# Patient Record
Sex: FEMALE | Race: WHITE | NOT HISPANIC OR LATINO | Employment: FULL TIME | URBAN - METROPOLITAN AREA
[De-identification: names, ages, dates, MRNs, and addresses within clinical notes are randomized per-mention and may not be internally consistent; named-entity substitution may affect disease eponyms.]

---

## 2017-04-28 ENCOUNTER — COMMUNICATION - HEALTHEAST (OUTPATIENT)
Dept: FAMILY MEDICINE | Facility: CLINIC | Age: 22
End: 2017-04-28

## 2017-04-28 DIAGNOSIS — F33.2 SEVERE EPISODE OF RECURRENT MAJOR DEPRESSIVE DISORDER, WITHOUT PSYCHOTIC FEATURES (H): ICD-10-CM

## 2017-09-06 ENCOUNTER — COMMUNICATION - HEALTHEAST (OUTPATIENT)
Dept: FAMILY MEDICINE | Facility: CLINIC | Age: 22
End: 2017-09-06

## 2017-09-07 ENCOUNTER — AMBULATORY - HEALTHEAST (OUTPATIENT)
Dept: FAMILY MEDICINE | Facility: CLINIC | Age: 22
End: 2017-09-07

## 2017-09-07 DIAGNOSIS — F33.2 SEVERE EPISODE OF RECURRENT MAJOR DEPRESSIVE DISORDER, WITHOUT PSYCHOTIC FEATURES (H): ICD-10-CM

## 2017-09-11 ENCOUNTER — COMMUNICATION - HEALTHEAST (OUTPATIENT)
Dept: FAMILY MEDICINE | Facility: CLINIC | Age: 22
End: 2017-09-11

## 2017-09-11 DIAGNOSIS — F33.2 SEVERE EPISODE OF RECURRENT MAJOR DEPRESSIVE DISORDER, WITHOUT PSYCHOTIC FEATURES (H): ICD-10-CM

## 2017-10-10 ENCOUNTER — OFFICE VISIT - HEALTHEAST (OUTPATIENT)
Dept: FAMILY MEDICINE | Facility: CLINIC | Age: 22
End: 2017-10-10

## 2017-10-10 DIAGNOSIS — Z30.09 BIRTH CONTROL COUNSELING: ICD-10-CM

## 2017-10-10 DIAGNOSIS — F33.1 MODERATE EPISODE OF RECURRENT MAJOR DEPRESSIVE DISORDER (H): ICD-10-CM

## 2017-10-10 ASSESSMENT — MIFFLIN-ST. JEOR: SCORE: 1364.56

## 2017-11-14 ENCOUNTER — COMMUNICATION - HEALTHEAST (OUTPATIENT)
Dept: FAMILY MEDICINE | Facility: CLINIC | Age: 22
End: 2017-11-14

## 2018-06-01 ENCOUNTER — COMMUNICATION - HEALTHEAST (OUTPATIENT)
Dept: FAMILY MEDICINE | Facility: CLINIC | Age: 23
End: 2018-06-01

## 2018-06-01 DIAGNOSIS — F33.1 MODERATE EPISODE OF RECURRENT MAJOR DEPRESSIVE DISORDER (H): ICD-10-CM

## 2018-06-04 ENCOUNTER — COMMUNICATION - HEALTHEAST (OUTPATIENT)
Dept: FAMILY MEDICINE | Facility: CLINIC | Age: 23
End: 2018-06-04

## 2018-06-04 DIAGNOSIS — F33.1 MODERATE EPISODE OF RECURRENT MAJOR DEPRESSIVE DISORDER (H): ICD-10-CM

## 2018-10-31 ENCOUNTER — COMMUNICATION - HEALTHEAST (OUTPATIENT)
Dept: CARE COORDINATION | Facility: CLINIC | Age: 23
End: 2018-10-31

## 2018-12-17 ENCOUNTER — OFFICE VISIT - HEALTHEAST (OUTPATIENT)
Dept: FAMILY MEDICINE | Facility: CLINIC | Age: 23
End: 2018-12-17

## 2018-12-17 DIAGNOSIS — F10.20 SEVERE ALCOHOL USE DISORDER (H): ICD-10-CM

## 2018-12-17 DIAGNOSIS — R20.2 PARESTHESIA: ICD-10-CM

## 2018-12-17 DIAGNOSIS — F43.12 CHRONIC POST-TRAUMATIC STRESS DISORDER (PTSD): ICD-10-CM

## 2018-12-17 DIAGNOSIS — F31.81 BIPOLAR 2 DISORDER, MAJOR DEPRESSIVE EPISODE (H): ICD-10-CM

## 2018-12-17 LAB
HBA1C MFR BLD: 5.2 % (ref 3.5–6)
RHEUMATOID FACT SERPL-ACNC: <15 IU/ML (ref 0–30)
VIT B12 SERPL-MCNC: 1210 PG/ML (ref 213–816)

## 2018-12-17 ASSESSMENT — MIFFLIN-ST. JEOR: SCORE: 1410.38

## 2018-12-18 ENCOUNTER — COMMUNICATION - HEALTHEAST (OUTPATIENT)
Dept: FAMILY MEDICINE | Facility: CLINIC | Age: 23
End: 2018-12-18

## 2018-12-18 LAB — ANA SER QL: 0.1 U

## 2019-01-14 ENCOUNTER — COMMUNICATION - HEALTHEAST (OUTPATIENT)
Dept: FAMILY MEDICINE | Facility: CLINIC | Age: 24
End: 2019-01-14

## 2019-01-22 ENCOUNTER — COMMUNICATION - HEALTHEAST (OUTPATIENT)
Dept: SCHEDULING | Facility: CLINIC | Age: 24
End: 2019-01-22

## 2019-01-23 ENCOUNTER — AMBULATORY - HEALTHEAST (OUTPATIENT)
Dept: FAMILY MEDICINE | Facility: CLINIC | Age: 24
End: 2019-01-23

## 2019-03-19 ENCOUNTER — COMMUNICATION - HEALTHEAST (OUTPATIENT)
Dept: FAMILY MEDICINE | Facility: CLINIC | Age: 24
End: 2019-03-19

## 2019-03-25 ENCOUNTER — OFFICE VISIT - HEALTHEAST (OUTPATIENT)
Dept: FAMILY MEDICINE | Facility: CLINIC | Age: 24
End: 2019-03-25

## 2019-03-25 ENCOUNTER — COMMUNICATION - HEALTHEAST (OUTPATIENT)
Dept: FAMILY MEDICINE | Facility: CLINIC | Age: 24
End: 2019-03-25

## 2019-03-25 DIAGNOSIS — F10.20 SEVERE ALCOHOL USE DISORDER (H): ICD-10-CM

## 2019-03-25 DIAGNOSIS — F43.12 CHRONIC POST-TRAUMATIC STRESS DISORDER (PTSD): ICD-10-CM

## 2019-03-25 DIAGNOSIS — N91.2 AMENORRHEA: ICD-10-CM

## 2019-03-25 DIAGNOSIS — F11.21 SEVERE OPIOID DEPENDENCE IN SUSTAINED REMISSION (H): ICD-10-CM

## 2019-03-25 DIAGNOSIS — F31.81 BIPOLAR 2 DISORDER, MAJOR DEPRESSIVE EPISODE (H): ICD-10-CM

## 2019-03-25 DIAGNOSIS — Z32.01 PREGNANCY TEST POSITIVE: ICD-10-CM

## 2019-03-25 LAB — HCG UR QL: POSITIVE

## 2019-03-25 ASSESSMENT — MIFFLIN-ST. JEOR: SCORE: 1403.58

## 2019-03-29 ENCOUNTER — COMMUNICATION - HEALTHEAST (OUTPATIENT)
Dept: FAMILY MEDICINE | Facility: CLINIC | Age: 24
End: 2019-03-29

## 2019-04-09 ENCOUNTER — HOSPITAL ENCOUNTER (OUTPATIENT)
Dept: LAB | Age: 24
Setting detail: SPECIMEN
Discharge: HOME | End: 2019-04-09

## 2019-04-09 ENCOUNTER — PRENATAL OFFICE VISIT - HEALTHEAST (OUTPATIENT)
Dept: FAMILY MEDICINE | Facility: CLINIC | Age: 24
End: 2019-04-09

## 2019-04-09 DIAGNOSIS — F33.1 MODERATE EPISODE OF RECURRENT MAJOR DEPRESSIVE DISORDER (H): ICD-10-CM

## 2019-04-09 DIAGNOSIS — F11.21 SEVERE OPIOID DEPENDENCE IN SUSTAINED REMISSION (H): ICD-10-CM

## 2019-04-09 DIAGNOSIS — Z12.4 CERVICAL CANCER SCREENING: ICD-10-CM

## 2019-04-09 DIAGNOSIS — F43.12 CHRONIC POST-TRAUMATIC STRESS DISORDER (PTSD): ICD-10-CM

## 2019-04-09 DIAGNOSIS — F31.81 BIPOLAR 2 DISORDER, MAJOR DEPRESSIVE EPISODE (H): ICD-10-CM

## 2019-04-09 DIAGNOSIS — Z34.01 ENCOUNTER FOR SUPERVISION OF NORMAL FIRST PREGNANCY IN FIRST TRIMESTER: ICD-10-CM

## 2019-04-09 LAB
ALBUMIN UR-MCNC: ABNORMAL MG/DL
AMPHETAMINES UR QL SCN: NORMAL
APPEARANCE UR: CLEAR
BARBITURATES UR QL: NORMAL
BENZODIAZ UR QL: NORMAL
BILIRUB UR QL STRIP: NEGATIVE
CANNABINOIDS UR QL SCN: NORMAL
CLUE CELLS: NORMAL
COCAINE UR QL: NORMAL
COLOR UR AUTO: YELLOW
CREAT UR-MCNC: 189.2 MG/DL
ERYTHROCYTE [DISTWIDTH] IN BLOOD BY AUTOMATED COUNT: 13 % (ref 11–14.5)
GLUCOSE UR STRIP-MCNC: NEGATIVE MG/DL
HCT VFR BLD AUTO: 39.8 % (ref 35–47)
HGB BLD-MCNC: 13.2 G/DL (ref 12–16)
HGB UR QL STRIP: ABNORMAL
HIV 1+2 AB+HIV1 P24 AG SERPL QL IA: NEGATIVE
KETONES UR STRIP-MCNC: NEGATIVE MG/DL
LEUKOCYTE ESTERASE UR QL STRIP: NEGATIVE
MCH RBC QN AUTO: 30.9 PG (ref 27–34)
MCHC RBC AUTO-ENTMCNC: 33.2 G/DL (ref 32–36)
MCV RBC AUTO: 93 FL (ref 80–100)
METHADONE UR QL SCN: NORMAL
NITRATE UR QL: NEGATIVE
OPIATES UR QL SCN: NORMAL
OXYCODONE UR QL: NORMAL
PCP UR QL SCN: NORMAL
PH UR STRIP: 6 [PH] (ref 5–8)
PLATELET # BLD AUTO: 271 THOU/UL (ref 140–440)
PMV BLD AUTO: 7.2 FL (ref 7–10)
RBC # BLD AUTO: 4.29 MILL/UL (ref 3.8–5.4)
SP GR UR STRIP: 1.02 (ref 1–1.03)
TRICHOMONAS, WET PREP: NORMAL
UROBILINOGEN UR STRIP-ACNC: ABNORMAL
WBC: 10.5 THOU/UL (ref 4–11)
YEAST, WET PREP: NORMAL

## 2019-04-10 LAB
ABO/RH(D): NORMAL
ABORH REPEAT: NORMAL
ANTIBODY SCREEN: NEGATIVE
BACTERIA SPEC CULT: NO GROWTH
C TRACH DNA SPEC QL PROBE+SIG AMP: NEGATIVE
HBV SURFACE AG SERPL QL IA: NEGATIVE
N GONORRHOEA DNA SPEC QL NAA+PROBE: NEGATIVE
RUBV IGG SERPL QL IA: POSITIVE
T PALLIDUM AB SER QL: NEGATIVE

## 2019-04-11 ENCOUNTER — COMMUNICATION - HEALTHEAST (OUTPATIENT)
Dept: FAMILY MEDICINE | Facility: CLINIC | Age: 24
End: 2019-04-11

## 2019-04-11 LAB
BKR LAB AP ABNORMAL BLEEDING: NO
BKR LAB AP BIRTH CONTROL/HORMONES: NORMAL
BKR LAB AP CERVICAL APPEARANCE: NORMAL
BKR LAB AP GYN ADEQUACY: NORMAL
BKR LAB AP GYN INTERPRETATION: NORMAL
BKR LAB AP HPV REFLEX: NORMAL
BKR LAB AP LMP: NORMAL
BKR LAB AP PATIENT STATUS: NORMAL
BKR LAB AP PREVIOUS ABNORMAL: NORMAL
BKR LAB AP PREVIOUS NORMAL: 2011
HIGH RISK?: NO
PATH REPORT.COMMENTS IMP SPEC: NORMAL
RESULT FLAG (HE HISTORICAL CONVERSION): NORMAL

## 2019-04-15 ENCOUNTER — HOSPITAL ENCOUNTER (OUTPATIENT)
Dept: ULTRASOUND IMAGING | Facility: CLINIC | Age: 24
Discharge: HOME | End: 2019-04-15
Attending: FAMILY MEDICINE

## 2019-04-15 DIAGNOSIS — Z34.01 ENCOUNTER FOR SUPERVISION OF NORMAL FIRST PREGNANCY IN FIRST TRIMESTER: ICD-10-CM

## 2019-04-17 ENCOUNTER — COMMUNICATION - HEALTHEAST (OUTPATIENT)
Dept: FAMILY MEDICINE | Facility: CLINIC | Age: 24
End: 2019-04-17

## 2019-04-23 ENCOUNTER — OFFICE VISIT - HEALTHEAST (OUTPATIENT)
Dept: FAMILY MEDICINE | Facility: CLINIC | Age: 24
End: 2019-04-23

## 2019-04-23 ENCOUNTER — HOSPITAL ENCOUNTER (OUTPATIENT)
Dept: LAB | Age: 24
Setting detail: SPECIMEN
Discharge: HOME | End: 2019-04-23

## 2019-04-23 DIAGNOSIS — R07.0 THROAT PAIN: ICD-10-CM

## 2019-04-23 LAB — DEPRECATED S PYO AG THROAT QL EIA: NORMAL

## 2019-04-24 ENCOUNTER — COMMUNICATION - HEALTHEAST (OUTPATIENT)
Dept: FAMILY MEDICINE | Facility: CLINIC | Age: 24
End: 2019-04-24

## 2019-04-24 LAB — GROUP A STREP BY PCR: NORMAL

## 2019-05-07 ENCOUNTER — RECORDS - HEALTHEAST (OUTPATIENT)
Dept: ADMINISTRATIVE | Facility: OTHER | Age: 24
End: 2019-05-07

## 2019-05-07 ENCOUNTER — PRENATAL OFFICE VISIT - HEALTHEAST (OUTPATIENT)
Dept: FAMILY MEDICINE | Facility: CLINIC | Age: 24
End: 2019-05-07

## 2019-05-07 DIAGNOSIS — F11.21 SEVERE OPIOID DEPENDENCE IN SUSTAINED REMISSION (H): ICD-10-CM

## 2019-05-07 DIAGNOSIS — Z34.01 ENCOUNTER FOR SUPERVISION OF NORMAL FIRST PREGNANCY IN FIRST TRIMESTER: ICD-10-CM

## 2019-05-07 DIAGNOSIS — Z78.9 ELECTRONIC CIGARETTE USE: ICD-10-CM

## 2019-05-07 DIAGNOSIS — F10.20 SEVERE ALCOHOL USE DISORDER (H): ICD-10-CM

## 2019-05-07 DIAGNOSIS — F31.81 BIPOLAR 2 DISORDER, MAJOR DEPRESSIVE EPISODE (H): ICD-10-CM

## 2019-05-07 DIAGNOSIS — F43.12 CHRONIC POST-TRAUMATIC STRESS DISORDER (PTSD): ICD-10-CM

## 2019-05-07 DIAGNOSIS — F33.1 MODERATE EPISODE OF RECURRENT MAJOR DEPRESSIVE DISORDER (H): ICD-10-CM

## 2019-05-21 ENCOUNTER — COMMUNICATION - HEALTHEAST (OUTPATIENT)
Dept: FAMILY MEDICINE | Facility: CLINIC | Age: 24
End: 2019-05-21

## 2019-06-04 ENCOUNTER — PRENATAL OFFICE VISIT - HEALTHEAST (OUTPATIENT)
Dept: FAMILY MEDICINE | Facility: CLINIC | Age: 24
End: 2019-06-04

## 2019-06-04 DIAGNOSIS — F10.20 SEVERE ALCOHOL USE DISORDER (H): ICD-10-CM

## 2019-06-04 DIAGNOSIS — F11.21 SEVERE OPIOID DEPENDENCE IN SUSTAINED REMISSION (H): ICD-10-CM

## 2019-06-04 DIAGNOSIS — F43.12 CHRONIC POST-TRAUMATIC STRESS DISORDER (PTSD): ICD-10-CM

## 2019-06-04 DIAGNOSIS — F33.1 MODERATE EPISODE OF RECURRENT MAJOR DEPRESSIVE DISORDER (H): ICD-10-CM

## 2019-06-04 DIAGNOSIS — Z34.02 ENCOUNTER FOR SUPERVISION OF NORMAL FIRST PREGNANCY IN SECOND TRIMESTER: ICD-10-CM

## 2019-06-04 DIAGNOSIS — Z78.9 ELECTRONIC CIGARETTE USE: ICD-10-CM

## 2019-06-04 DIAGNOSIS — F31.81 BIPOLAR 2 DISORDER, MAJOR DEPRESSIVE EPISODE (H): ICD-10-CM

## 2019-06-25 ENCOUNTER — HOSPITAL ENCOUNTER (OUTPATIENT)
Dept: ULTRASOUND IMAGING | Facility: CLINIC | Age: 24
Discharge: HOME | End: 2019-06-25
Attending: FAMILY MEDICINE

## 2019-06-25 ENCOUNTER — COMMUNICATION - HEALTHEAST (OUTPATIENT)
Dept: SCHEDULING | Facility: CLINIC | Age: 24
End: 2019-06-25

## 2019-06-26 ENCOUNTER — COMMUNICATION - HEALTHEAST (OUTPATIENT)
Dept: FAMILY MEDICINE | Facility: CLINIC | Age: 24
End: 2019-06-26

## 2019-07-02 ENCOUNTER — PRENATAL OFFICE VISIT - HEALTHEAST (OUTPATIENT)
Dept: FAMILY MEDICINE | Facility: CLINIC | Age: 24
End: 2019-07-02

## 2019-07-02 DIAGNOSIS — F10.20 SEVERE ALCOHOL USE DISORDER (H): ICD-10-CM

## 2019-07-02 DIAGNOSIS — Z34.02 ENCOUNTER FOR SUPERVISION OF NORMAL FIRST PREGNANCY IN SECOND TRIMESTER: ICD-10-CM

## 2019-07-02 DIAGNOSIS — F33.1 MODERATE EPISODE OF RECURRENT MAJOR DEPRESSIVE DISORDER (H): ICD-10-CM

## 2019-07-02 DIAGNOSIS — F43.12 CHRONIC POST-TRAUMATIC STRESS DISORDER (PTSD): ICD-10-CM

## 2019-07-02 DIAGNOSIS — Z78.9 ELECTRONIC CIGARETTE USE: ICD-10-CM

## 2019-07-02 DIAGNOSIS — F31.81 BIPOLAR 2 DISORDER, MAJOR DEPRESSIVE EPISODE (H): ICD-10-CM

## 2019-07-02 DIAGNOSIS — F11.21 SEVERE OPIOID DEPENDENCE IN SUSTAINED REMISSION (H): ICD-10-CM

## 2019-08-06 ENCOUNTER — PRENATAL OFFICE VISIT - HEALTHEAST (OUTPATIENT)
Dept: FAMILY MEDICINE | Facility: CLINIC | Age: 24
End: 2019-08-06

## 2019-08-06 DIAGNOSIS — F31.81 BIPOLAR 2 DISORDER, MAJOR DEPRESSIVE EPISODE (H): ICD-10-CM

## 2019-08-06 DIAGNOSIS — F11.21 SEVERE OPIOID DEPENDENCE IN SUSTAINED REMISSION (H): ICD-10-CM

## 2019-08-06 DIAGNOSIS — Z78.9 ELECTRONIC CIGARETTE USE: ICD-10-CM

## 2019-08-06 DIAGNOSIS — Z34.02 ENCOUNTER FOR SUPERVISION OF NORMAL FIRST PREGNANCY IN SECOND TRIMESTER: ICD-10-CM

## 2019-08-06 DIAGNOSIS — F33.1 MODERATE EPISODE OF RECURRENT MAJOR DEPRESSIVE DISORDER (H): ICD-10-CM

## 2019-08-06 DIAGNOSIS — F10.20 SEVERE ALCOHOL USE DISORDER (H): ICD-10-CM

## 2019-08-06 DIAGNOSIS — F43.12 CHRONIC POST-TRAUMATIC STRESS DISORDER (PTSD): ICD-10-CM

## 2019-08-06 LAB
ERYTHROCYTE [DISTWIDTH] IN BLOOD BY AUTOMATED COUNT: 11.9 % (ref 11–14.5)
FASTING STATUS PATIENT QL REPORTED: NORMAL
GLUCOSE 1H P 50 G GLC PO SERPL-MCNC: 125 MG/DL (ref 70–139)
HCT VFR BLD AUTO: 34.9 % (ref 35–47)
HGB BLD-MCNC: 12.2 G/DL (ref 12–16)
MCH RBC QN AUTO: 32.5 PG (ref 27–34)
MCHC RBC AUTO-ENTMCNC: 34.9 G/DL (ref 32–36)
MCV RBC AUTO: 93 FL (ref 80–100)
PLATELET # BLD AUTO: 240 THOU/UL (ref 140–440)
PMV BLD AUTO: 6.8 FL (ref 7–10)
RBC # BLD AUTO: 3.75 MILL/UL (ref 3.8–5.4)
WBC: 9.1 THOU/UL (ref 4–11)

## 2019-08-07 ENCOUNTER — COMMUNICATION - HEALTHEAST (OUTPATIENT)
Dept: FAMILY MEDICINE | Facility: CLINIC | Age: 24
End: 2019-08-07

## 2019-08-07 LAB — T PALLIDUM AB SER QL: NEGATIVE

## 2019-08-30 ENCOUNTER — COMMUNICATION - HEALTHEAST (OUTPATIENT)
Dept: FAMILY MEDICINE | Facility: CLINIC | Age: 24
End: 2019-08-30

## 2019-08-30 DIAGNOSIS — F33.1 MODERATE EPISODE OF RECURRENT MAJOR DEPRESSIVE DISORDER (H): ICD-10-CM

## 2019-08-30 DIAGNOSIS — F31.81 BIPOLAR 2 DISORDER, MAJOR DEPRESSIVE EPISODE (H): ICD-10-CM

## 2019-08-30 DIAGNOSIS — F43.12 CHRONIC POST-TRAUMATIC STRESS DISORDER (PTSD): ICD-10-CM

## 2019-09-03 ENCOUNTER — PRENATAL OFFICE VISIT - HEALTHEAST (OUTPATIENT)
Dept: FAMILY MEDICINE | Facility: CLINIC | Age: 24
End: 2019-09-03

## 2019-09-03 DIAGNOSIS — F11.21 SEVERE OPIOID DEPENDENCE IN SUSTAINED REMISSION (H): ICD-10-CM

## 2019-09-03 DIAGNOSIS — F43.12 CHRONIC POST-TRAUMATIC STRESS DISORDER (PTSD): ICD-10-CM

## 2019-09-03 DIAGNOSIS — Z78.9 ELECTRONIC CIGARETTE USE: ICD-10-CM

## 2019-09-03 DIAGNOSIS — F31.81 BIPOLAR 2 DISORDER, MAJOR DEPRESSIVE EPISODE (H): ICD-10-CM

## 2019-09-03 DIAGNOSIS — F10.20 SEVERE ALCOHOL USE DISORDER (H): ICD-10-CM

## 2019-09-03 DIAGNOSIS — F33.1 MODERATE EPISODE OF RECURRENT MAJOR DEPRESSIVE DISORDER (H): ICD-10-CM

## 2019-09-03 DIAGNOSIS — Z34.03 ENCOUNTER FOR SUPERVISION OF NORMAL FIRST PREGNANCY IN THIRD TRIMESTER: ICD-10-CM

## 2019-09-03 ASSESSMENT — ANXIETY QUESTIONNAIRES
3. WORRYING TOO MUCH ABOUT DIFFERENT THINGS: SEVERAL DAYS
6. BECOMING EASILY ANNOYED OR IRRITABLE: MORE THAN HALF THE DAYS
7. FEELING AFRAID AS IF SOMETHING AWFUL MIGHT HAPPEN: NOT AT ALL
1. FEELING NERVOUS, ANXIOUS, OR ON EDGE: SEVERAL DAYS
GAD7 TOTAL SCORE: 8
2. NOT BEING ABLE TO STOP OR CONTROL WORRYING: SEVERAL DAYS
5. BEING SO RESTLESS THAT IT IS HARD TO SIT STILL: SEVERAL DAYS
4. TROUBLE RELAXING: MORE THAN HALF THE DAYS

## 2019-09-03 ASSESSMENT — PATIENT HEALTH QUESTIONNAIRE - PHQ9: SUM OF ALL RESPONSES TO PHQ QUESTIONS 1-9: 6

## 2019-09-24 ENCOUNTER — PRENATAL OFFICE VISIT - HEALTHEAST (OUTPATIENT)
Dept: FAMILY MEDICINE | Facility: CLINIC | Age: 24
End: 2019-09-24

## 2019-09-24 DIAGNOSIS — F11.21 SEVERE OPIOID DEPENDENCE IN SUSTAINED REMISSION (H): ICD-10-CM

## 2019-09-24 DIAGNOSIS — F31.81 BIPOLAR 2 DISORDER, MAJOR DEPRESSIVE EPISODE (H): ICD-10-CM

## 2019-09-24 DIAGNOSIS — Z34.03 ENCOUNTER FOR SUPERVISION OF NORMAL FIRST PREGNANCY IN THIRD TRIMESTER: ICD-10-CM

## 2019-09-24 DIAGNOSIS — F43.12 CHRONIC POST-TRAUMATIC STRESS DISORDER (PTSD): ICD-10-CM

## 2019-09-24 DIAGNOSIS — F10.20 SEVERE ALCOHOL USE DISORDER (H): ICD-10-CM

## 2019-09-24 DIAGNOSIS — F33.1 MODERATE EPISODE OF RECURRENT MAJOR DEPRESSIVE DISORDER (H): ICD-10-CM

## 2019-09-24 ASSESSMENT — PATIENT HEALTH QUESTIONNAIRE - PHQ9: SUM OF ALL RESPONSES TO PHQ QUESTIONS 1-9: 3

## 2019-09-24 ASSESSMENT — ANXIETY QUESTIONNAIRES
2. NOT BEING ABLE TO STOP OR CONTROL WORRYING: NOT AT ALL
1. FEELING NERVOUS, ANXIOUS, OR ON EDGE: SEVERAL DAYS
6. BECOMING EASILY ANNOYED OR IRRITABLE: SEVERAL DAYS
7. FEELING AFRAID AS IF SOMETHING AWFUL MIGHT HAPPEN: NOT AT ALL
GAD7 TOTAL SCORE: 4
5. BEING SO RESTLESS THAT IT IS HARD TO SIT STILL: NOT AT ALL
4. TROUBLE RELAXING: SEVERAL DAYS
3. WORRYING TOO MUCH ABOUT DIFFERENT THINGS: SEVERAL DAYS

## 2019-10-08 ENCOUNTER — PRENATAL OFFICE VISIT - HEALTHEAST (OUTPATIENT)
Dept: FAMILY MEDICINE | Facility: CLINIC | Age: 24
End: 2019-10-08

## 2019-10-08 ENCOUNTER — HOSPITAL ENCOUNTER (OUTPATIENT)
Dept: ULTRASOUND IMAGING | Facility: CLINIC | Age: 24
Discharge: HOME | End: 2019-10-08
Attending: FAMILY MEDICINE

## 2019-10-08 DIAGNOSIS — F31.81 BIPOLAR 2 DISORDER, MAJOR DEPRESSIVE EPISODE (H): ICD-10-CM

## 2019-10-08 DIAGNOSIS — Z34.03 ENCOUNTER FOR SUPERVISION OF NORMAL FIRST PREGNANCY IN THIRD TRIMESTER: ICD-10-CM

## 2019-10-08 DIAGNOSIS — F43.12 CHRONIC POST-TRAUMATIC STRESS DISORDER (PTSD): ICD-10-CM

## 2019-10-08 DIAGNOSIS — F10.11 HISTORY OF ALCOHOL ABUSE: ICD-10-CM

## 2019-10-08 DIAGNOSIS — F33.1 MODERATE EPISODE OF RECURRENT MAJOR DEPRESSIVE DISORDER (H): ICD-10-CM

## 2019-10-08 DIAGNOSIS — F11.11 HX OF OPIOID ABUSE (H): ICD-10-CM

## 2019-10-08 ASSESSMENT — PATIENT HEALTH QUESTIONNAIRE - PHQ9: SUM OF ALL RESPONSES TO PHQ QUESTIONS 1-9: 8

## 2019-10-08 ASSESSMENT — MIFFLIN-ST. JEOR: SCORE: 1450.29

## 2019-10-09 ENCOUNTER — AMBULATORY - HEALTHEAST (OUTPATIENT)
Dept: MATERNAL FETAL MEDICINE | Facility: HOSPITAL | Age: 24
End: 2019-10-09

## 2019-10-09 ENCOUNTER — AMBULATORY - HEALTHEAST (OUTPATIENT)
Dept: FAMILY MEDICINE | Facility: CLINIC | Age: 24
End: 2019-10-09

## 2019-10-09 ENCOUNTER — COMMUNICATION - HEALTHEAST (OUTPATIENT)
Dept: FAMILY MEDICINE | Facility: CLINIC | Age: 24
End: 2019-10-09

## 2019-10-09 DIAGNOSIS — O26.90 PREGNANCY, ANTEPARTUM, COMPLICATIONS: ICD-10-CM

## 2019-10-09 DIAGNOSIS — O28.3 ABNORMAL FETAL ULTRASOUND: ICD-10-CM

## 2019-10-11 ENCOUNTER — RECORDS - HEALTHEAST (OUTPATIENT)
Dept: ADMINISTRATIVE | Facility: OTHER | Age: 24
End: 2019-10-11

## 2019-10-11 ENCOUNTER — OFFICE VISIT - HEALTHEAST (OUTPATIENT)
Dept: MATERNAL FETAL MEDICINE | Facility: HOSPITAL | Age: 24
End: 2019-10-11

## 2019-10-11 ENCOUNTER — RECORDS - HEALTHEAST (OUTPATIENT)
Dept: ULTRASOUND IMAGING | Facility: HOSPITAL | Age: 24
End: 2019-10-11

## 2019-10-11 DIAGNOSIS — O26.90 PREGNANCY RELATED CONDITIONS, UNSPECIFIED, UNSPECIFIED TRIMESTER: ICD-10-CM

## 2019-10-11 DIAGNOSIS — Z03.74 FETAL GROWTH PROBLEM SUSPECTED BUT NOT FOUND: ICD-10-CM

## 2019-10-11 DIAGNOSIS — O35.9XX0 SUSPECTED FETAL ANOMALY, ANTEPARTUM: ICD-10-CM

## 2019-10-15 ENCOUNTER — PRENATAL OFFICE VISIT - HEALTHEAST (OUTPATIENT)
Dept: FAMILY MEDICINE | Facility: CLINIC | Age: 24
End: 2019-10-15

## 2019-10-15 DIAGNOSIS — F33.1 MODERATE EPISODE OF RECURRENT MAJOR DEPRESSIVE DISORDER (H): ICD-10-CM

## 2019-10-15 DIAGNOSIS — F10.11 HISTORY OF ALCOHOL ABUSE: ICD-10-CM

## 2019-10-15 DIAGNOSIS — Z34.03 ENCOUNTER FOR SUPERVISION OF NORMAL FIRST PREGNANCY IN THIRD TRIMESTER: ICD-10-CM

## 2019-10-15 DIAGNOSIS — F43.12 CHRONIC POST-TRAUMATIC STRESS DISORDER (PTSD): ICD-10-CM

## 2019-10-15 DIAGNOSIS — F31.81 BIPOLAR 2 DISORDER, MAJOR DEPRESSIVE EPISODE (H): ICD-10-CM

## 2019-10-15 DIAGNOSIS — F11.11 HX OF OPIOID ABUSE (H): ICD-10-CM

## 2019-10-15 ASSESSMENT — ANXIETY QUESTIONNAIRES
3. WORRYING TOO MUCH ABOUT DIFFERENT THINGS: NOT AT ALL
7. FEELING AFRAID AS IF SOMETHING AWFUL MIGHT HAPPEN: NOT AT ALL
2. NOT BEING ABLE TO STOP OR CONTROL WORRYING: NOT AT ALL
4. TROUBLE RELAXING: SEVERAL DAYS
6. BECOMING EASILY ANNOYED OR IRRITABLE: SEVERAL DAYS
GAD7 TOTAL SCORE: 4
1. FEELING NERVOUS, ANXIOUS, OR ON EDGE: SEVERAL DAYS
5. BEING SO RESTLESS THAT IT IS HARD TO SIT STILL: SEVERAL DAYS

## 2019-10-15 ASSESSMENT — PATIENT HEALTH QUESTIONNAIRE - PHQ9: SUM OF ALL RESPONSES TO PHQ QUESTIONS 1-9: 3

## 2019-10-16 ENCOUNTER — COMMUNICATION - HEALTHEAST (OUTPATIENT)
Dept: FAMILY MEDICINE | Facility: CLINIC | Age: 24
End: 2019-10-16

## 2019-10-16 LAB
ALLERGIC TO PENICILLIN: NO
GP B STREP DNA SPEC QL NAA+PROBE: NEGATIVE

## 2019-10-22 ENCOUNTER — PRENATAL OFFICE VISIT - HEALTHEAST (OUTPATIENT)
Dept: FAMILY MEDICINE | Facility: CLINIC | Age: 24
End: 2019-10-22

## 2019-10-22 DIAGNOSIS — F33.1 MODERATE EPISODE OF RECURRENT MAJOR DEPRESSIVE DISORDER (H): ICD-10-CM

## 2019-10-22 DIAGNOSIS — Z34.03 ENCOUNTER FOR SUPERVISION OF NORMAL FIRST PREGNANCY IN THIRD TRIMESTER: ICD-10-CM

## 2019-10-22 DIAGNOSIS — F11.11 HX OF OPIOID ABUSE (H): ICD-10-CM

## 2019-10-22 DIAGNOSIS — F31.81 BIPOLAR 2 DISORDER, MAJOR DEPRESSIVE EPISODE (H): ICD-10-CM

## 2019-10-22 DIAGNOSIS — F10.11 HISTORY OF ALCOHOL ABUSE: ICD-10-CM

## 2019-10-22 DIAGNOSIS — F43.12 CHRONIC POST-TRAUMATIC STRESS DISORDER (PTSD): ICD-10-CM

## 2019-10-28 ENCOUNTER — COMMUNICATION - HEALTHEAST (OUTPATIENT)
Dept: FAMILY MEDICINE | Facility: CLINIC | Age: 24
End: 2019-10-28

## 2019-10-28 DIAGNOSIS — F43.12 CHRONIC POST-TRAUMATIC STRESS DISORDER (PTSD): ICD-10-CM

## 2019-10-28 DIAGNOSIS — F33.1 MODERATE EPISODE OF RECURRENT MAJOR DEPRESSIVE DISORDER (H): ICD-10-CM

## 2019-10-28 DIAGNOSIS — F31.81 BIPOLAR 2 DISORDER, MAJOR DEPRESSIVE EPISODE (H): ICD-10-CM

## 2019-10-29 ENCOUNTER — PRENATAL OFFICE VISIT - HEALTHEAST (OUTPATIENT)
Dept: FAMILY MEDICINE | Facility: CLINIC | Age: 24
End: 2019-10-29

## 2019-10-29 DIAGNOSIS — F43.12 CHRONIC POST-TRAUMATIC STRESS DISORDER (PTSD): ICD-10-CM

## 2019-10-29 DIAGNOSIS — F31.81 BIPOLAR 2 DISORDER, MAJOR DEPRESSIVE EPISODE (H): ICD-10-CM

## 2019-10-29 DIAGNOSIS — Z34.03 ENCOUNTER FOR SUPERVISION OF NORMAL FIRST PREGNANCY IN THIRD TRIMESTER: ICD-10-CM

## 2019-10-29 DIAGNOSIS — F33.1 MODERATE EPISODE OF RECURRENT MAJOR DEPRESSIVE DISORDER (H): ICD-10-CM

## 2019-10-29 DIAGNOSIS — F10.11 HISTORY OF ALCOHOL ABUSE: ICD-10-CM

## 2019-10-29 DIAGNOSIS — F11.11 HX OF OPIOID ABUSE (H): ICD-10-CM

## 2019-11-04 ENCOUNTER — RECORDS - HEALTHEAST (OUTPATIENT)
Dept: ADMINISTRATIVE | Facility: OTHER | Age: 24
End: 2019-11-04

## 2019-11-04 ENCOUNTER — COMMUNICATION - HEALTHEAST (OUTPATIENT)
Dept: SCHEDULING | Facility: CLINIC | Age: 24
End: 2019-11-04

## 2019-11-04 ENCOUNTER — OFFICE VISIT - HEALTHEAST (OUTPATIENT)
Dept: MATERNAL FETAL MEDICINE | Facility: HOSPITAL | Age: 24
End: 2019-11-04

## 2019-11-04 ENCOUNTER — RECORDS - HEALTHEAST (OUTPATIENT)
Dept: ULTRASOUND IMAGING | Facility: HOSPITAL | Age: 24
End: 2019-11-04

## 2019-11-04 DIAGNOSIS — O41.00X0 OLIGOHYDRAMNIOS, ANTEPARTUM, SINGLE OR UNSPECIFIED FETUS: ICD-10-CM

## 2019-11-04 DIAGNOSIS — O35.9XX0 MATERNAL CARE FOR (SUSPECTED) FETAL ABNORMALITY AND DAMAGE, UNSPECIFIED, NOT APPLICABLE OR UNSPECIFIED: ICD-10-CM

## 2019-11-04 DIAGNOSIS — O36.5990 PREGNANCY AFFECTED BY FETAL GROWTH RESTRICTION: ICD-10-CM

## 2019-11-05 ENCOUNTER — ANESTHESIA - HEALTHEAST (OUTPATIENT)
Dept: OBGYN | Facility: HOSPITAL | Age: 24
End: 2019-11-05

## 2019-11-07 ENCOUNTER — HOME CARE/HOSPICE - HEALTHEAST (OUTPATIENT)
Dept: HOME HEALTH SERVICES | Facility: HOME HEALTH | Age: 24
End: 2019-11-07

## 2019-11-10 ENCOUNTER — HOME CARE/HOSPICE - HEALTHEAST (OUTPATIENT)
Dept: HOME HEALTH SERVICES | Facility: HOME HEALTH | Age: 24
End: 2019-11-10

## 2019-12-11 ENCOUNTER — COMMUNICATION - HEALTHEAST (OUTPATIENT)
Dept: FAMILY MEDICINE | Facility: CLINIC | Age: 24
End: 2019-12-11

## 2019-12-11 DIAGNOSIS — F31.81 BIPOLAR 2 DISORDER, MAJOR DEPRESSIVE EPISODE (H): ICD-10-CM

## 2019-12-11 DIAGNOSIS — F33.1 MODERATE EPISODE OF RECURRENT MAJOR DEPRESSIVE DISORDER (H): ICD-10-CM

## 2019-12-11 DIAGNOSIS — F43.12 CHRONIC POST-TRAUMATIC STRESS DISORDER (PTSD): ICD-10-CM

## 2020-01-15 ENCOUNTER — COMMUNICATION - HEALTHEAST (OUTPATIENT)
Dept: FAMILY MEDICINE | Facility: CLINIC | Age: 25
End: 2020-01-15

## 2020-01-15 DIAGNOSIS — F43.12 CHRONIC POST-TRAUMATIC STRESS DISORDER (PTSD): ICD-10-CM

## 2020-01-15 DIAGNOSIS — F31.81 BIPOLAR 2 DISORDER, MAJOR DEPRESSIVE EPISODE (H): ICD-10-CM

## 2020-01-15 DIAGNOSIS — F33.1 MODERATE EPISODE OF RECURRENT MAJOR DEPRESSIVE DISORDER (H): ICD-10-CM

## 2020-01-29 ENCOUNTER — COMMUNICATION - HEALTHEAST (OUTPATIENT)
Dept: FAMILY MEDICINE | Facility: CLINIC | Age: 25
End: 2020-01-29

## 2020-04-03 ENCOUNTER — COMMUNICATION - HEALTHEAST (OUTPATIENT)
Dept: FAMILY MEDICINE | Facility: CLINIC | Age: 25
End: 2020-04-03

## 2020-06-05 ENCOUNTER — COMMUNICATION - HEALTHEAST (OUTPATIENT)
Dept: FAMILY MEDICINE | Facility: CLINIC | Age: 25
End: 2020-06-05

## 2020-06-05 DIAGNOSIS — F43.12 CHRONIC POST-TRAUMATIC STRESS DISORDER (PTSD): ICD-10-CM

## 2020-06-05 DIAGNOSIS — F31.81 BIPOLAR 2 DISORDER, MAJOR DEPRESSIVE EPISODE (H): ICD-10-CM

## 2020-06-05 DIAGNOSIS — F33.1 MODERATE EPISODE OF RECURRENT MAJOR DEPRESSIVE DISORDER (H): ICD-10-CM

## 2020-06-10 ENCOUNTER — OFFICE VISIT - HEALTHEAST (OUTPATIENT)
Dept: FAMILY MEDICINE | Facility: CLINIC | Age: 25
End: 2020-06-10

## 2020-06-10 DIAGNOSIS — F43.12 CHRONIC POST-TRAUMATIC STRESS DISORDER (PTSD): ICD-10-CM

## 2020-06-10 DIAGNOSIS — F11.21 OPIOID DEPENDENCE, IN REMISSION (H): ICD-10-CM

## 2020-06-10 DIAGNOSIS — F33.1 MODERATE EPISODE OF RECURRENT MAJOR DEPRESSIVE DISORDER (H): ICD-10-CM

## 2020-06-10 DIAGNOSIS — Z87.898 HISTORY OF ALCOHOL USE DISORDER: ICD-10-CM

## 2020-06-10 ASSESSMENT — ANXIETY QUESTIONNAIRES
2. NOT BEING ABLE TO STOP OR CONTROL WORRYING: SEVERAL DAYS
3. WORRYING TOO MUCH ABOUT DIFFERENT THINGS: SEVERAL DAYS
6. BECOMING EASILY ANNOYED OR IRRITABLE: SEVERAL DAYS
7. FEELING AFRAID AS IF SOMETHING AWFUL MIGHT HAPPEN: NOT AT ALL
5. BEING SO RESTLESS THAT IT IS HARD TO SIT STILL: NOT AT ALL
1. FEELING NERVOUS, ANXIOUS, OR ON EDGE: SEVERAL DAYS
GAD7 TOTAL SCORE: 4
4. TROUBLE RELAXING: NOT AT ALL

## 2020-06-10 ASSESSMENT — PATIENT HEALTH QUESTIONNAIRE - PHQ9: SUM OF ALL RESPONSES TO PHQ QUESTIONS 1-9: 6

## 2020-10-12 ENCOUNTER — COMMUNICATION - HEALTHEAST (OUTPATIENT)
Dept: FAMILY MEDICINE | Facility: CLINIC | Age: 25
End: 2020-10-12

## 2020-10-12 DIAGNOSIS — F43.12 CHRONIC POST-TRAUMATIC STRESS DISORDER (PTSD): ICD-10-CM

## 2020-10-12 DIAGNOSIS — F33.1 MODERATE EPISODE OF RECURRENT MAJOR DEPRESSIVE DISORDER (H): ICD-10-CM

## 2021-02-26 ENCOUNTER — COMMUNICATION - HEALTHEAST (OUTPATIENT)
Dept: SCHEDULING | Facility: CLINIC | Age: 26
End: 2021-02-26

## 2021-02-28 ENCOUNTER — COMMUNICATION - HEALTHEAST (OUTPATIENT)
Dept: SCHEDULING | Facility: CLINIC | Age: 26
End: 2021-02-28

## 2021-03-11 ENCOUNTER — COMMUNICATION - HEALTHEAST (OUTPATIENT)
Dept: SCHEDULING | Facility: CLINIC | Age: 26
End: 2021-03-11

## 2021-03-16 ENCOUNTER — OFFICE VISIT - HEALTHEAST (OUTPATIENT)
Dept: FAMILY MEDICINE | Facility: CLINIC | Age: 26
End: 2021-03-16

## 2021-03-16 DIAGNOSIS — Z23 NEED FOR VACCINATION: ICD-10-CM

## 2021-03-16 DIAGNOSIS — E87.0 HYPERNATREMIA: ICD-10-CM

## 2021-03-16 DIAGNOSIS — F31.81 BIPOLAR 2 DISORDER, MAJOR DEPRESSIVE EPISODE (H): ICD-10-CM

## 2021-03-16 DIAGNOSIS — E87.6 HYPOKALEMIA: ICD-10-CM

## 2021-03-16 DIAGNOSIS — F41.9 ANXIETY: ICD-10-CM

## 2021-03-16 LAB
ANION GAP SERPL CALCULATED.3IONS-SCNC: 8 MMOL/L (ref 5–18)
BUN SERPL-MCNC: 8 MG/DL (ref 8–22)
CALCIUM SERPL-MCNC: 9 MG/DL (ref 8.5–10.5)
CHLORIDE BLD-SCNC: 105 MMOL/L (ref 98–107)
CO2 SERPL-SCNC: 27 MMOL/L (ref 22–31)
CREAT SERPL-MCNC: 0.68 MG/DL (ref 0.6–1.1)
GFR SERPL CREATININE-BSD FRML MDRD: >60 ML/MIN/1.73M2
GLUCOSE BLD-MCNC: 82 MG/DL (ref 70–125)
POTASSIUM BLD-SCNC: 5.2 MMOL/L (ref 3.5–5)
SODIUM SERPL-SCNC: 140 MMOL/L (ref 136–145)

## 2021-03-16 ASSESSMENT — ANXIETY QUESTIONNAIRES
4. TROUBLE RELAXING: MORE THAN HALF THE DAYS
3. WORRYING TOO MUCH ABOUT DIFFERENT THINGS: SEVERAL DAYS
1. FEELING NERVOUS, ANXIOUS, OR ON EDGE: SEVERAL DAYS
6. BECOMING EASILY ANNOYED OR IRRITABLE: NOT AT ALL
7. FEELING AFRAID AS IF SOMETHING AWFUL MIGHT HAPPEN: NOT AT ALL
GAD7 TOTAL SCORE: 7
5. BEING SO RESTLESS THAT IT IS HARD TO SIT STILL: SEVERAL DAYS
IF YOU CHECKED OFF ANY PROBLEMS ON THIS QUESTIONNAIRE, HOW DIFFICULT HAVE THESE PROBLEMS MADE IT FOR YOU TO DO YOUR WORK, TAKE CARE OF THINGS AT HOME, OR GET ALONG WITH OTHER PEOPLE: SOMEWHAT DIFFICULT
2. NOT BEING ABLE TO STOP OR CONTROL WORRYING: MORE THAN HALF THE DAYS

## 2021-03-16 ASSESSMENT — PATIENT HEALTH QUESTIONNAIRE - PHQ9: SUM OF ALL RESPONSES TO PHQ QUESTIONS 1-9: 9

## 2021-03-17 ENCOUNTER — COMMUNICATION - HEALTHEAST (OUTPATIENT)
Dept: FAMILY MEDICINE | Facility: CLINIC | Age: 26
End: 2021-03-17

## 2021-03-17 DIAGNOSIS — F31.81 BIPOLAR 2 DISORDER, MAJOR DEPRESSIVE EPISODE (H): ICD-10-CM

## 2021-04-20 ENCOUNTER — OFFICE VISIT - HEALTHEAST (OUTPATIENT)
Dept: FAMILY MEDICINE | Facility: CLINIC | Age: 26
End: 2021-04-20

## 2021-04-20 DIAGNOSIS — F41.9 ANXIETY: ICD-10-CM

## 2021-04-20 DIAGNOSIS — F43.12 CHRONIC POST-TRAUMATIC STRESS DISORDER (PTSD): ICD-10-CM

## 2021-04-20 DIAGNOSIS — F33.1 MODERATE EPISODE OF RECURRENT MAJOR DEPRESSIVE DISORDER (H): ICD-10-CM

## 2021-04-20 DIAGNOSIS — Z87.898 HISTORY OF ALCOHOL USE DISORDER: ICD-10-CM

## 2021-04-20 DIAGNOSIS — F31.81 BIPOLAR 2 DISORDER, MAJOR DEPRESSIVE EPISODE (H): ICD-10-CM

## 2021-04-20 DIAGNOSIS — Z30.09 BIRTH CONTROL COUNSELING: ICD-10-CM

## 2021-04-20 DIAGNOSIS — T50.901A DRUG OVERDOSE, ACCIDENTAL OR UNINTENTIONAL, INITIAL ENCOUNTER: ICD-10-CM

## 2021-04-21 ASSESSMENT — ANXIETY QUESTIONNAIRES
1. FEELING NERVOUS, ANXIOUS, OR ON EDGE: SEVERAL DAYS
5. BEING SO RESTLESS THAT IT IS HARD TO SIT STILL: NOT AT ALL
GAD7 TOTAL SCORE: 5
3. WORRYING TOO MUCH ABOUT DIFFERENT THINGS: NOT AT ALL
2. NOT BEING ABLE TO STOP OR CONTROL WORRYING: SEVERAL DAYS
4. TROUBLE RELAXING: MORE THAN HALF THE DAYS
7. FEELING AFRAID AS IF SOMETHING AWFUL MIGHT HAPPEN: NOT AT ALL
IF YOU CHECKED OFF ANY PROBLEMS ON THIS QUESTIONNAIRE, HOW DIFFICULT HAVE THESE PROBLEMS MADE IT FOR YOU TO DO YOUR WORK, TAKE CARE OF THINGS AT HOME, OR GET ALONG WITH OTHER PEOPLE: SOMEWHAT DIFFICULT
6. BECOMING EASILY ANNOYED OR IRRITABLE: SEVERAL DAYS

## 2021-04-21 ASSESSMENT — PATIENT HEALTH QUESTIONNAIRE - PHQ9: SUM OF ALL RESPONSES TO PHQ QUESTIONS 1-9: 7

## 2021-04-26 ENCOUNTER — AMBULATORY - HEALTHEAST (OUTPATIENT)
Dept: NURSING | Facility: CLINIC | Age: 26
End: 2021-04-26

## 2021-04-29 ENCOUNTER — COMMUNICATION - HEALTHEAST (OUTPATIENT)
Dept: SCHEDULING | Facility: CLINIC | Age: 26
End: 2021-04-29

## 2021-04-29 ENCOUNTER — COMMUNICATION - HEALTHEAST (OUTPATIENT)
Dept: FAMILY MEDICINE | Facility: CLINIC | Age: 26
End: 2021-04-29

## 2021-05-02 ENCOUNTER — COMMUNICATION - HEALTHEAST (OUTPATIENT)
Dept: SCHEDULING | Facility: CLINIC | Age: 26
End: 2021-05-02

## 2021-05-02 ENCOUNTER — COMMUNICATION - HEALTHEAST (OUTPATIENT)
Dept: FAMILY MEDICINE | Facility: CLINIC | Age: 26
End: 2021-05-02

## 2021-05-02 DIAGNOSIS — F31.81 BIPOLAR 2 DISORDER, MAJOR DEPRESSIVE EPISODE (H): ICD-10-CM

## 2021-05-02 RX ORDER — BUSPIRONE HYDROCHLORIDE 5 MG/1
5 TABLET ORAL 3 TIMES DAILY
Qty: 90 TABLET | Refills: 3 | Status: SHIPPED | OUTPATIENT
Start: 2021-05-02 | End: 2021-10-02

## 2021-05-11 ENCOUNTER — OFFICE VISIT - HEALTHEAST (OUTPATIENT)
Dept: FAMILY MEDICINE | Facility: CLINIC | Age: 26
End: 2021-05-11

## 2021-05-11 DIAGNOSIS — Z30.017 ENCOUNTER FOR INITIAL PRESCRIPTION OF IMPLANTABLE SUBDERMAL CONTRACEPTIVE: ICD-10-CM

## 2021-05-11 LAB — HCG UR QL: NEGATIVE

## 2021-05-17 ENCOUNTER — AMBULATORY - HEALTHEAST (OUTPATIENT)
Dept: NURSING | Facility: CLINIC | Age: 26
End: 2021-05-17

## 2021-05-21 ENCOUNTER — COMMUNICATION - HEALTHEAST (OUTPATIENT)
Dept: FAMILY MEDICINE | Facility: CLINIC | Age: 26
End: 2021-05-21

## 2021-05-26 VITALS
DIASTOLIC BLOOD PRESSURE: 78 MMHG | HEART RATE: 78 BPM | OXYGEN SATURATION: 100 % | RESPIRATION RATE: 16 BRPM | SYSTOLIC BLOOD PRESSURE: 122 MMHG | TEMPERATURE: 98 F

## 2021-05-26 ASSESSMENT — PATIENT HEALTH QUESTIONNAIRE - PHQ9
SUM OF ALL RESPONSES TO PHQ QUESTIONS 1-9: 6
SUM OF ALL RESPONSES TO PHQ QUESTIONS 1-9: 3
SUM OF ALL RESPONSES TO PHQ QUESTIONS 1-9: 3
SUM OF ALL RESPONSES TO PHQ QUESTIONS 1-9: 8

## 2021-05-27 VITALS
DIASTOLIC BLOOD PRESSURE: 84 MMHG | OXYGEN SATURATION: 99 % | SYSTOLIC BLOOD PRESSURE: 110 MMHG | WEIGHT: 120 LBS | HEART RATE: 79 BPM

## 2021-05-27 ASSESSMENT — PATIENT HEALTH QUESTIONNAIRE - PHQ9
SUM OF ALL RESPONSES TO PHQ QUESTIONS 1-9: 7
SUM OF ALL RESPONSES TO PHQ QUESTIONS 1-9: 9
SUM OF ALL RESPONSES TO PHQ QUESTIONS 1-9: 6

## 2021-05-27 NOTE — PATIENT INSTRUCTIONS - HE
Tips to Relieve Nausea  Although nausea can occur at any time of the day, it may be worse in the morning. To help prevent nausea:    Eat small, light meals at frequent intervals.    Get up slowly. Eat a few unsalted crackers before you get out of bed.    Drink water or 7-Up to soothe your stomach.    Eat an ice pop in your favorite flavor.    Ask your health care provider about taking devon or vitamin B6 for nausea and vomiting.    Talk with your health care provider if you take vitamins that upset your stomach.  Safe Medications    Take one prenatal vitamin with at least 400 mcg of folic acid daily.    Use acetaminophen (Tylenol) for pain.     Try Maalox or Tums for heartburn. If these are not working, talk to your doctor about trying a different medication.    Diphenhydramine (Benadryl) can also be used safely in pregnancy.    Talk to your doctor about any other medications or vitamins you are taking!  Start Healthy Habits Now  What matters most is protecting your baby from this moment on. If you smoke, drink alcohol, or use drugs, now is the time to stop. If you need help, talk with your health care provider.    Smoking increases the risk of miscarriage or having a low-birth-weight baby. If you smoke, quit now.    Alcohol and drugs have been linked with miscarriage, birth defects, intellectual disability, and low birth weight. Do not drink alcohol or take drugs.    Continue your current exercise routine, or start one with walking, swimming, and other low impact exercises. Yoga specifically designed for pregnant moms is a great stress and pain reliever. Keep your self well hydrated and avoid overheating with all activity. If bleeding, fluid leakage, or cramping/contractions occur, stop the exercise and call your doctor.     Wear your seatbelt every time you are in the car. Fasten the lap part underneath your growing belly and the shoulder part as you normally would.   Weight Gain    Add an additional 300 to 400  calories a day into your diet.    Ideal weight gain is 25 to 35 pounds.  Keep Your Environment Save  You can still clean house and use scented products. Just take some simple precautions:    Wear gloves when using cleaning fluids.    Open windows to let in fresh air. Use a fan if you paint.    Avoid secondhand smoke.    Don t breathe fumes from nail polish, hair spray, cleansers, or other chemicals.  Work Concerns  The end of the first trimester is a good time to discuss working during pregnancy with your employer. Follow your health care provider s advice if your job requires you to stand for a long time, work with hazardous tools, or even sit at a desk all day. Your workspace, workload, or scheduled hours may need to be adjusted - perhaps you can change body postures more often or take an extra break.  Intimacy  Unless your health care provider tells you to, there is no reason to stop having sex while you re pregnant. You or your partner may notice changes in desire. Desire may be less in the first trimester, due to nausea and fatigue. In the second trimester, sex may be very enjoyable. The third trimester can be a challenge comfort-wise. Try different positions and see what s best for you both. As always, let your doctor know if you experience any bleeding, leakage of fluids, or cramping/contractions.  Other Pregnancy Concerns    Limit the amount of radiation you are exposed to. Always tell the radiology technologist that you are pregnant if having an xray or CT scan done.     Practice good hand washing to prevent infection.    Avoid cat litter.     Wash all fruits and vegetabes. Always cook meat thoroughly and do not eat raw fish. Do not eat more than 6 to 12 ounces of other fish sources.     Do not eat soft cheeses.    Limit caffeine to less than 300 milligrams a days. The average cup of coffee as approximately 120 milligrams of caffeine.      Nonprescription Medications Thought To Be Safe In Pregnancy (take  medication according to package directions)    NAUSEA AND MOTION SICKNESS    Vitamin C 500 mg, once a day with food    Vitamin B6 50 mg, one three times a day    Unisom tablets (not gel tabs) - 1/2 to 1 tab at bedtime; may also take 1/2 tab in the morning and mid-afternoon    Dramamine    Sea Bands    Tierney tablets    CONGESTION AND COLDS    Robitussin and Robitussin DM    Chlortrimeton    Benadryl    Vicks Vapor Rub    Cough Drops    Mucinex    ALLERGIES    Alavert    Claritin    Tavist    Benadryl    Zyrtec    HEADACHES    Tylenol 325 mg 2-3 four times a day    Tylenol 500 mg 1-2 four times a day    DO NOT EXCEED 4,000 MG A DAY  DO NOT TAKE IBUERPROFEN, MOTRIN, ADVIL, ASPIRIN, OR ALEVE UNLESS ADVISED BY A PROVIDER     HEARTBURN    Tums    Zantac    Maalox (tablets or liquid)    Rolaids    Mylanta    Gaviscon    Pepcid AC  NO PEPTOBISMOL OR ALKASELTZER (contains aspirin)     DIARRHEA (do not treat for the first 24-48 hrs)    Imodium AD    CONSTIPATION    Colace (Docusate Sodium) - stool softener    Aleah-Colace (Colace + mild stimulant)    Any fiber supplement (Metamucil, Fibercon ,etc.)    GAS    Gas-X    Mylanta II with Simethicone    Mylicon

## 2021-05-27 NOTE — TELEPHONE ENCOUNTER
New Appointment Needed  What is the reason for the visit:    First OB Appt Request  Have you had a pregnancy confirmation appointment at a [facility] primary care clinic?:  Yes 3/25/19  When was your positive home pregnancy test?:   Yes     Provider Preference: Any available  How soon do you need to be seen?: as able  - patient states she would prefer a Monday or a Tuesday late morning or afternoon   Waitlist offered?: No  Okay to leave a detailed message:  Yes

## 2021-05-27 NOTE — TELEPHONE ENCOUNTER
Reason contacted:  Medication problem  Information relayed:  Below message, patient will continue lexapro until she sees an OB provider.   Additional questions:  No  Further follow-up needed:  No  Okay to leave a detailed message:  No

## 2021-05-27 NOTE — PROGRESS NOTES
Assessment and Plan:     1. Amenorrhea  Pregnancy (Beta-hCG, Qual), Urine   2. Pregnancy test positive     3. Severe opioid dependence in sustained remission (H)     4. Severe alcohol use disorder (H)     5. Chronic post-traumatic stress disorder (PTSD)     6. Bipolar 2 disorder, major depressive episode (H)       Patient's pregnancy test is positive.  Discussed starting a prenatal vitamin with DHA.  She does not have health insurance at this time, so unfortunately I am unable to perform an ultrasound to date the pregnancy.  Discussed avoidance of smoking, alcohol, drugs.  She is taking escitalopram for depression.  She has tried Zoloft in the past with no relief.  I called and discussed the case with Dr. Grande who says that Prozac and Zoloft are well studied.  She can certainly continue her escitalopram until she sees an OB/GYN as it acts similarly .  Patient will research this and let me know what she decides.  Provided 1st trimester handouts and books. She is unsure of whom she wants to establish OB care with. Discussed consuming a healthy diet and exercising.  She is to avoid contact sports to the abdomen. She plans on applying for health insurance. She is content with the plan.     I spent 25 minutes with the patient with greater than 50% spent discussing symptoms, treatment options, and coordination of care.       Subjective:     Jerome is a 23 y.o. female presenting to the clinic for pregnancy confirmation.  She is present with her boyfriend of 5-1/2 years.  Her last menstrual period was 1/2-2 months ago.  She is unsure of the exact date.  She had a positive at home pregnancy test last Monday.  This is an unplanned pregnancy.  She has been experiencing fatigue and breast tenderness.  She is not taking a prenatal multivitamin.  She is smoking an e-cigarette and is trying to cut back.  She denies alcohol or drug use.  She has a history of severe alcohol use disorder, chronic PTSD, bipolar disorder, major  depression.  Patient is taking escitalopram 20 mg daily.  She is unsure if she should continue this medication.  She does not have health insurance at this time.    Review of Systems: A complete 14 point review of systems was obtained and is negative or as stated in the history of present illness.    Social History     Socioeconomic History     Marital status: Single     Spouse name: Not on file     Number of children: Not on file     Years of education: Not on file     Highest education level: Not on file   Occupational History     Not on file   Social Needs     Financial resource strain: Not on file     Food insecurity:     Worry: Not on file     Inability: Not on file     Transportation needs:     Medical: Not on file     Non-medical: Not on file   Tobacco Use     Smoking status: Current Some Day Smoker     Last attempt to quit: 2014     Years since quittin.2     Smokeless tobacco: Current User     Tobacco comment: e-cig smoker   Substance and Sexual Activity     Alcohol use: Yes     Alcohol/week: 1.2 oz     Types: 2 Cans of beer per week     Comment: She reports binge drinking on days off (two days out of the week) and drinks beers occasionally before work     Drug use: No     Comment: Hasn't used illicit drugs in 3 years     Sexual activity: Yes     Partners: Male     Comment: engaged   Lifestyle     Physical activity:     Days per week: Not on file     Minutes per session: Not on file     Stress: Not on file   Relationships     Social connections:     Talks on phone: Not on file     Gets together: Not on file     Attends [employer] service: Not on file     Active member of club or organization: Not on file     Attends meetings of clubs or organizations: Not on file     Relationship status: Not on file     Intimate partner violence:     Fear of current or ex partner: Not on file     Emotionally abused: Not on file     Physically abused: Not on file     Forced sexual activity: Not on file   Other Topics  "Concern     Not on file   Social History Narrative     Not on file       Active Ambulatory Problems     Diagnosis Date Noted     Bipolar 2 disorder, major depressive episode (H) 10/25/2018     Chronic post-traumatic stress disorder (PTSD) 10/25/2018     Severe alcohol use disorder (H) 10/25/2018     Severe opioid dependence in sustained remission (H) 10/25/2018     Resolved Ambulatory Problems     Diagnosis Date Noted     No Resolved Ambulatory Problems     Past Medical History:   Diagnosis Date     Alcohol abuse      Bipolar 2 disorder (H)      Deliberate self-cutting      Depression      Drug abuse, opioid type (H)      PTSD (post-traumatic stress disorder)      Suicidal ideation        Family History   Problem Relation Age of Onset     Suicidality Father        Objective:     /64   Pulse 82   Ht 5' 3\" (1.6 m)   Wt 154 lb 3.2 oz (69.9 kg)   SpO2 99%   BMI 27.32 kg/m      Patient is alert, in no obvious distress.   Skin: Warm, dry.    Lungs:  Clear to auscultation. Respirations even and unlabored.  No wheezing or rales noted.   Heart:  Regular rate and rhythm.  No murmurs, S3, S4, gallops, or rubs.    Abdomen: Soft, nontender.  No organomegaly. Bowel sounds normoactive. No guarding or masses noted.       LABORATORY: Urine pregnancy test ordered and is positive.            "

## 2021-05-27 NOTE — TELEPHONE ENCOUNTER
----- Message from Norma Green CNP sent at 3/25/2019 12:41 PM CDT -----  Please notify the patient that I discussed her Lexapro use with Dr. Grande who is an ob provider.  She said that the most studied antidepressants in pregnancy are Prozac and Zoloft.  She says Lexapro has not been well studied, but acts similar to the others.  She feels it is okay to continue her Lexapro until she sees an OB provider and she can have further discussion with the OB provider.  Otherwise, I can certainly change her to Prozac or Zoloft. Please let me know what she decides. Thanks.

## 2021-05-27 NOTE — TELEPHONE ENCOUNTER
----- Message from Taty Kilpatrick CMA sent at 4/16/2019  8:10 AM CDT -----      ----- Message -----  From: Yoon Villafuerte MD  Sent: 4/16/2019   7:09 AM  To: Yoon Villafuerte Care Team Pool    Please call pt with test result.    Branden Cano,    Your ultrasound overall looked good. You were 9 weeks and 6 days on the day of your ultrasound. There was a small area of bleeding where the baby implanted in your uterus but this is nothing to be concerned about. I will see you in a few weeks.    Thanks,  Dr Villafuerte  
Left message to call back for: test results within this encounter. Please give Dr Loja note below     
Patient Returning Call  Reason for call:   patient returning call  Information relayed to patient:   Hi Jerome,     Your ultrasound overall looked good. You were 9 weeks and 6 days on the day of your ultrasound. There was a small area of bleeding where the baby implanted in your uterus but this is nothing to be concerned about. I will see you in a few weeks.     Thanks,  Dr Villafuerte     Patient has additional questions:  No  If YES, what are your questions/concerns:  none  Okay to leave a detailed message?: No call back needed  
You can access the FollowMyHealth Patient Portal offered by [facility] by registering at the following website: [email or web address]. By joining [facility]’s FollowMyHealth portal, you will also be able to view your health information using other applications (apps) compatible with our system.

## 2021-05-28 ASSESSMENT — ANXIETY QUESTIONNAIRES
GAD7 TOTAL SCORE: 7
GAD7 TOTAL SCORE: 4
GAD7 TOTAL SCORE: 4
GAD7 TOTAL SCORE: 8
GAD7 TOTAL SCORE: 5
GAD7 TOTAL SCORE: 4

## 2021-05-28 NOTE — TELEPHONE ENCOUNTER
Patient Returning Call  Reason for call:  Returning phone call.  Information relayed to patient:  Below message relayed to patient.  Patient has additional questions:  No  If YES, what are your questions/concerns:  No additional questions at this time.  Okay to leave a detailed message?: No call back needed

## 2021-05-28 NOTE — PROGRESS NOTES
Clinic Note - Return OB Visit    Jerome Avila is a 24 y.o.  female who presents to clinic for a follow up OB visit. She is currently 13w0d with an estimated date of delivery of 2019 via first trimester ultrasound. She denies headache, chest pain, shortness of breath, abdominal pain/contractions, vaginal bleeding, or abnormal discharge. She has not yet felt baby move.     New concerns today:   -recently seen for suspected strep - now feeling better from that standpoint  -hx depression, bipolar, PTSD: depression has gotten a little worse, on lexapro 20mg daily but does not feel that this is managing her symptoms, PHQ9 score 16 today, GAD7 score 13 today, not currently doing therapy now but has in the past  -hx opioid dependence: hx pills for a short time, but didn't do any treatment  -hx alcohol use: no alcohol since finding out was pregnant  -Patient does use e-cigarettes daily - has cut down, now just occasionally  -Family history positive for 2 cousins with cystic fibrosis    OB History    Para Term  AB Living   1             SAB TAB Ectopic Multiple Live Births                  # Outcome Date GA Lbr Tyron/2nd Weight Sex Delivery Anes PTL Lv   1 Current                Physical exam:  /60   Wt 148 lb 7 oz (67.3 kg)   LMP 02/10/2019 (Approximate)   BMI 26.29 kg/m      General: alert, female in no acute distress  Abdomen: gravid uterus appropriate for gestation age above pubic symphysis, non-tender, FHTs 160  Extremities: no edema    Assessment/Plan:  1. Encounter for supervision of normal first pregnancy in first trimester  G1 at 13+0 weeks today.  Overall feeling well at this time.  Risks during this pregnancy include: Mental health issues on SSRI therapy, history of opioid and alcohol abuse now in remission, current occasional e-cigarette use, family history of cystic fibrosis in 2 cousins.  Discussed genetic and carrier testing today given family history, patient interested in  these, these were ordered today.    2. Moderate episode of recurrent major depressive disorder (H)  3. Bipolar 2 disorder, major depressive episode (H)  4. Chronic post-traumatic stress disorder (PTSD)  Patient reports worsening mood, will increase Lexapro to 30 mg daily, discussed this is next dose and if it is not managing her symptoms then we would consider switching to alternative SSRI such as Zoloft.  - escitalopram oxalate (LEXAPRO) 10 MG tablet; Take 3 tablets (30 mg total) by mouth daily.  Dispense: 90 tablet; Refill: 1    5. Severe opioid dependence in sustained remission (H)  6. Severe alcohol use disorder (H)  Continued remission from these substances.    7. Electronic cigarette use  Patient continues to use E cigarettes intermittently, but states has cut down since last visit.    Reviewed pre-chano labs. Follow up blood type B positive.  Reviewed genetic screening options, including false positive rate of 5% with screening tests and diagnostic options (chorionic villus sampling, amniocentesis), and patient requests genetic testing for potential chromosomal abnormalities - progenity ordered. Given pt's family hx of cystic fibrosis will also check carrier testing as well.   Continued recommendation for breastfeeding.  Discussed warning signs to watch for and expectations for second trimester.     Follow up in 4 weeks for routine prenatal visit.     Yoon Villafuerte MD

## 2021-05-28 NOTE — TELEPHONE ENCOUNTER
----- Message from Malu Yuan MD sent at 4/24/2019  2:56 PM CDT -----  Please call and let pt know her strep culture is negative. If she is feeling better on the antibiotics, complete the course.  Have her return for further evaluation if symptoms are worsening

## 2021-05-28 NOTE — PROGRESS NOTES
ASSESSMENT/PLAN:  1. Throat pain  23-year-old female approximately 11 weeks gestation who presents with sore throat clinically consistent with a strep pharyngitis.  Initial rapid strep was negative however due to the appearance of her throat, symptom complaints in anterior cervical lymphadenopathy, I like to treat empirically with penicillin.  She is advised this is safe to take during pregnancy.  Throat culture is currently pending.  Symptoms are improving on the penicillin, she will complete the 10-day course.  - Rapid Strep A Screen- Throat Swab  - Group A Strep, RNA Direct Detection, Throat  - penicillin VK (PEN VK) 500 MG tablet; Take 1 tablet (500 mg total) by mouth 3 (three) times a day for 10 days.  Dispense: 30 tablet; Refill: 0    Return in about 2 weeks (around 5/7/2019), or sooner as needed, for next OB appt.    Patient Instructions   Your rapid strep test is negative.  A rRNA test is pending and will return tomorrow.  We will let you know the results of the culture.  We will prescribe an antibiotic.  I also recommend continuing to push fluids and use tylenol and/or ibuprofen as needed. If symptoms are not improving in the next 3-5 days or are increasing over time please call the clinic back again.      Orders Placed This Encounter   Procedures     Rapid Strep A Screen- Throat Swab     Group A Strep, RNA Direct Detection, Throat     There are no discontinued medications.    CHIEF COMPLAINT;  Chief Complaint   Patient presents with     Sore Throat     x yesterday      Cough     A little bit       HISTORY OF PRESENT ILLNESS:  Jerome is a 23 y.o. female presenting to the clinic today for sore throat. She endorses a mild cough, left-sided neck swelling, and left ear pain. It is a little hard to swallow. She denies smoking. She denies sick contacts. She is taking Lexapro 20 mg daily and a prenatal vitamin.     REVIEW OF SYSTEMS:  She is approximately 11 weeks pregnant. All other systems are  negative.    PFSH:  Reviewed, as below.    TOBACCO USE:  Social History     Tobacco Use   Smoking Status Current Some Day Smoker     Last attempt to quit: 2014     Years since quittin.3   Smokeless Tobacco Current User   Tobacco Comment    e-cig smoker       VITALS:  Vitals:    19 1326   BP: 98/60   Patient Site: Left Arm   Patient Position: Sitting   Cuff Size: Adult Regular   Pulse: 86   Temp: 97.8  F (36.6  C)   TempSrc: Oral   Weight: 150 lb 14.4 oz (68.4 kg)     Wt Readings from Last 3 Encounters:   19 150 lb 14.4 oz (68.4 kg)   19 151 lb 1 oz (68.5 kg)   19 154 lb 3.2 oz (69.9 kg)     Body mass index is 26.73 kg/m .    PHYSICAL EXAM:  GENERAL APPEARANCE: Alert, cooperative, no distress, appears stated age  HEAD: Normocephalic, without obvious abnormality, atraumatic  EYES:  PERRL, conjunctiva/corneas clear, EOM's intact, fundi     benign, both eyes       EARS: Normal TM's and external ear canals, both ears  NOSE:  Nares normal, septum midline, mucosa normal, no drainage or sinus tenderness  THROAT: Lips, mucosa, and tongue normal; teeth and gums normal  NECK: Supple, symmetrical, trachea midline, large submandibular lymph node on left with a few anterior cervical lymph nodes  LUNGS: Clear to auscultation bilaterally, respirations unlabored  CHEST WALL: No tenderness or deformity  HEART: Regular rate and rhythm, S1 and S2 normal, no murmur, rub or gallop  NEUROLOGIC: CNII-XII intact.     RECENT RESULTS  Recent Results (from the past 48 hour(s))   Rapid Strep A Screen- Throat Swab    Collection Time: 19  1:45 PM   Result Value Ref Range    Rapid Strep A Antigen No Group A Strep detected, presumptive negative No Group A Strep detected, presumptive negative       ADDITIONAL HISTORY SUMMARIZED (2): None.  DECISION TO OBTAIN EXTRA INFORMATION (1): None.  RADIOLOGY TESTS (1): None.  LABS (1): RST ordered.  MEDICINE TESTS (1): None.  INDEPENDENT REVIEW (2 each): None.    The visit  lasted a total of 8 minutes face to face with the patient. Over 50% of the time was spent counseling and educating the patient about sore throat.    I, Mai Booth, am scribing for and in the presence of, Dr. Yuan.    I, Dr. Yuan, personally performed the services described in this documentation, as scribed by Mai Booth in my presence, and it is both accurate and complete.    Dragon dictation was used for this note.  Speech recognition errors are a possibility.    MEDICATIONS:  Current Outpatient Medications   Medication Sig Dispense Refill     escitalopram oxalate (LEXAPRO) 20 MG tablet Take 20 mg by mouth daily.       melatonin 1 mg Tab tablet Take 3 mg by mouth bedtime as needed.       penicillin VK (PEN VK) 500 MG tablet Take 1 tablet (500 mg total) by mouth 3 (three) times a day for 10 days. 30 tablet 0     No current facility-administered medications for this visit.        Total data points: 1

## 2021-05-28 NOTE — PATIENT INSTRUCTIONS - HE
Your rapid strep test is negative.  A rRNA test is pending and will return tomorrow.  We will let you know the results of the culture.  We will prescribe an antibiotic.  I also recommend continuing to push fluids and use tylenol and/or ibuprofen as needed. If symptoms are not improving in the next 3-5 days or are increasing over time please call the clinic back again.

## 2021-05-28 NOTE — PATIENT INSTRUCTIONS - HE
Phone Numbers:  [facility]: [phone]  [facility]: [phone]    Increase lexapro to 30mg daily    When to call or come in to the hospital   If you have any bleeding or leakage of fluids.   If you have uterine cramping that does not resolve with rest and fluids.  If you have a headache not resolved with tylenol, right upper abdominal pain, or sudden onset of swelling.  You know your body best. Never hesitate to call or go to the hospital if something doesn't feel right!    Genetic Screening  Sampling of your blood to screen for genetic abnormalities, like Down's syndrome, in your baby  Screening test only - further testing, like advanced ultrasound or amniocentesis, would be needed to confirm positive test!  Talk to your doctor if you have further questions, or are interested in this screening test.     Breastfeeding  Breast feeding is best, for you and baby!   Recommendations are for exclusive breastfeeding for babies first 6 months of life.  Talk to your doctor if you have more questions about breastfeeding your baby.    Other Pregnancy Concerns  Continue to take a prenatal vitamin daily  Maintain an active, healthy lifestyle.   If this is your first baby, you can expect to start feeling movement at 20-24 weeks gestation. If this is your second or more pregnancy, you will generally start feeling movement at 18-22 weeks gestation.

## 2021-05-29 NOTE — TELEPHONE ENCOUNTER
Left message to call back for: test results within this encounter. Please give Dr Loja notes below

## 2021-05-29 NOTE — PATIENT INSTRUCTIONS - HE
Phone Numbers:  [facility]: [phone]  [facility]: [phone]    When to call or come in to the hospital   If you have any bleeding or leakage of fluids.   If you have uterine cramping that does not resolve with rest and fluids.  If you have a headache not resolved with tylenol, right upper abdominal pain, or sudden onset of swelling.  You know your body best. Never hesitate to call or go to the hospital if something doesn't feel right!    Breastfeeding  Breast feeding is best, for you and baby!   Recommendations are for exclusive breastfeeding for babies first 6 months of life.  Talk to your doctor if you have more questions about breastfeeding your baby.    Other Pregnancy Concerns  Continue to take a prenatal vitamin daily  Maintain an active, healthy lifestyle.   If this is your first baby, you can expect to start feeling movement at 20-24 weeks gestation. If this is your second or more pregnancy, you will generally start feeling movement at 18-22 weeks gestation.

## 2021-05-29 NOTE — PROGRESS NOTES
Clinic Note - Return OB Visit    Jerome Avila is a 24 y.o.  female who presents to clinic for a follow up OB visit. She is currently 17w0d with an estimated date of delivery of 2019 via first trimester ultrasound. She denies headache, chest pain, shortness of breath, abdominal pain/contractions, vaginal bleeding, or abnormal discharge. She has not felt baby move.     New concerns today: none  -hx depression, bipolar, PTSD: currenly on lexapro 30mg daily - feels better/manageable right now, PHQ9 score 8 today, GAD7 score 8 today, not currently doing therapy now but has in the past  -hx opioid dependence: hx pills for a short time, but didn't do any treatment  -hx alcohol use: no alcohol since finding out was pregnant  -Patient does use e-cigarettes daily - has cut down, now just occasionally, trying to use lowest nicotine level or even no nicotine     OB History    Para Term  AB Living   1             SAB TAB Ectopic Multiple Live Births                  # Outcome Date GA Lbr Tyron/2nd Weight Sex Delivery Anes PTL Lv   1 Current                Physical exam:  /60   Wt 152 lb 8 oz (69.2 kg)   LMP 02/10/2019 (Approximate)   BMI 27.01 kg/m      General: alert, female in no acute distress  Abdomen: gravid uterus appropriate for gestation age above pubic symphysis, non-tender, FHTs 155  Extremities: no edema    Assessment/Plan:  1. Encounter for supervision of normal first pregnancy in second trimester  G1 at 17 weeks gestation today.  Overall doing well at this time. Risks during this pregnancy include: Mental health issues on SSRI therapy, history of opioid and alcohol abuse now in remission, current occasional e-cigarette use.  Genetic testing was normal, expecting baby girl, maternal negative CF carrier.  Fetal survey ultrasound ordered today, to be scheduled in 3 weeks at 20 weeks gestation.  - US OB > = 14 Weeks    2. Moderate episode of recurrent major depressive disorder (H)  3.  Bipolar 2 disorder, major depressive episode (H)  4. Chronic post-traumatic stress disorder (PTSD)  Patient reports stable/manageable mood symptoms at this time, will continue Lexapro 30 mg daily.    5. Severe opioid dependence in sustained remission (H)  6. Severe alcohol use disorder (H)  Continued remission from these substances.    7. Electronic cigarette use  Patient continues to use E cigarettes intermittently but has cut down since last visit. Again discussed potential risks of nicotine during pregnancy and encourage cessation.    Reviewed pre-chano labs. Follow up blood type B positive.  Continued recommendation for breastfeeding.  Discussed warning signs to watch for and expectations for second trimester.     Follow up in 4 weeks for routine prenatal visit.     Yoon Villafuerte MD

## 2021-05-29 NOTE — TELEPHONE ENCOUNTER
Patient Returning Call  Reason for call:  Return call  Information relayed to patient:  Patient was informed of Dr. Villafuerte's message below in regards to patient's genetic lab results. Patient and her partner, was informed of the gender of the baby per her request.  Patient has additional questions:  No  If YES, what are your questions/concerns:  n/a  Okay to leave a detailed message?: No call back needed

## 2021-05-29 NOTE — TELEPHONE ENCOUNTER
Please call patient with genetic test results.    Patient screened negative for cystic fibrosis.  Baby's genetic testing came back normal.  No evidence of chromosomal abnormalities such as Down syndrome.  Please ask patient how she would like to find out the gender of her baby - can inform her of result or send letter with this information (test showed baby girl).    Dr Villafuerte

## 2021-05-30 NOTE — TELEPHONE ENCOUNTER
Triage call:   Patient is calling initially to ask if it is OK to take Omeprazole while pregnant. Has been experiencing black and tarry bowel movements and has had a history of an Ulcer. Also experiencing the same stomach pain as when she previously had an ulcer.     Triaged to be seen in the ER- reviewed additional care advice with patient and she verbalizes understanding. Will go in to be seen now.     Marcie Lane RN [facility] Triage/Med Refill 6/25/2019 1:49 PM    Reason for Disposition    Bloody, black, or tarry bowel movements    Protocols used: RECTAL BLEEDING-A-[place]

## 2021-05-30 NOTE — PROGRESS NOTES
Clinic Note - Return OB Visit    Jerome Avila is a 24 y.o.  female who presents to clinic for a follow up OB visit. She is currently 21w0d with an estimated date of delivery of 1219 via  tri . She denies headache, chest pain, shortness of breath, abdominal pain/contractions, vaginal bleeding, or abnormal discharge. She has not felt baby move consistently yet.     New concerns today:   -restless leg, at night, right leg and sometimes right arm; hx occasional restless leg, sometimes can get it to go away by lying on right side, will try magnesium and melatonin when it gets really bad, feels like tingling in spine/extremities and if ignores it for too long it becomes more uncomfortable, 4-5x/week  -weight down a little since last visit - states heartburn has been an issue for her lately so hasn't been eating as much, reports hx ulcer  -hx depression, bipolar, PTSD: currenly on lexapro 30mg daily - feels better/manageable right now, not currently doing therapy now but has in the past  -hx opioid dependence: hx pills for a short time, but didn't do any treatment  -hx alcohol use: no alcohol since finding out was pregnant  -Patient does use e-cigarettes daily - has cut down, now just occasionally, trying to use lowest nicotine level or even no nicotine     OB History    Para Term  AB Living   1             SAB TAB Ectopic Multiple Live Births                  # Outcome Date GA Lbr Tyron/2nd Weight Sex Delivery Anes PTL Lv   1 Current                Physical exam:  /60 (Patient Site: Right Arm, Patient Position: Sitting, Cuff Size: Adult Regular)   Pulse 81   Wt 148 lb 12.8 oz (67.5 kg)   LMP 02/10/2019 (Approximate)   SpO2 98%   BMI 26.36 kg/m      General: alert, female in no acute distress, sweaty  Abdomen: gravid uterus appropriate for gestation age at 22 cm, non-tender, FHTs 155 bpm  Extremities: no edema    Assessment/Plan:  1. Encounter for supervision of normal first pregnancy  in second trimester  G1 at 21 weeks today.  Overall doing well at this time. Risks during this pregnancy include: Mental health issues on SSRI therapy, history of opioid and alcohol abuse now in remission, current occasional e-cigarette use.  Genetic testing was normal, expecting baby girl, maternal negative CF carrier.  Fetal survey normal.    2. Moderate episode of recurrent major depressive disorder (H)  3. Bipolar 2 disorder, major depressive episode (H)  4. Chronic post-traumatic stress disorder (PTSD)  Patient reports stable/manageable mood symptoms at this time, will continue Lexapro 30 mg daily.    5. Severe opioid dependence in sustained remission (H)  6. Severe alcohol use disorder (H)  Continued remission from these substances reported. Increased sweaty today, if similar symptoms or I have concerns at next visit we will check UDS.    7. Electronic cigarette use  Patient continues to use E cigarettes intermittently but has cut down during pregnancy. Again discussed potential risks of nicotine during pregnancy and encourage cessation.      Discussed expectations of second trimester and when to call/come in.  Continued encouragement of breastfeeding.    Follow up in 4 weeks for routine prenatal visit     Yoon Villafuerte MD

## 2021-05-31 VITALS — HEIGHT: 64 IN | WEIGHT: 142.1 LBS | BODY MASS INDEX: 24.26 KG/M2

## 2021-05-31 NOTE — TELEPHONE ENCOUNTER
Reason contacted:  Test results within this encounter   Information relayed:  Dr kimball notes below   Additional questions:  No  Further follow-up needed:  No  Okay to leave a detailed message:  Gaviota Gustafson

## 2021-05-31 NOTE — PATIENT INSTRUCTIONS - HE
Phone Numbers:  [facility]: [phone]  [facility]: [phone]     When to call or come in to the hospital  If you notice a decrease in your baby's movement.   If your begin to experience contractions that are 5 minutes or less apart and lasting for over 45 seconds, or if contractions are becoming more painful.  If you have any bleeding or leakage of fluids.   If you have a headache not resolved with tylenol, right upper abdominal pain, or sudden onset of swelling.  You know your body best. Never hesitate to call or go to the hospital if something doesn't feel right!    Blood Glucose Screening During Pregnancy   Gestational diabetes is diabetes that only pregnant women get. Changes in your body during pregnancy can cause high blood sugar (glucose). This can cause problems for you and your baby. It is a serious condition, but it can be controlled.  What to Know If You Test Positive   Gestational diabetes is treatable. The best way to control gestational diabetes is to find out you have it as early as possible and start treatment quickly.   Gestational diabetes can cause problems for the mother during pregnancy. It can also cause problems with the baby during pregnancy, delivery, and after. Treatment greatly lowers the chance for problems.   The changes in your body that cause gestational diabetes normally occur only when you are pregnant. After the baby is born, your body goes back to normal and the condition goes away. You may be more likely to have type 2 diabetes later, though. So talk to your doctor about ways to help prevent type 2 diabetes.  Treating Gestational Diabetes   You ll need to check your blood sugar regularly. You can do this at home by pricking your finger and checking a drop of blood on a glucose monitor. Your health care provider will show you how and when to check your blood sugar and discuss your target blood sugar level.   To manage your blood sugar, you will be given a special plan.  It will likely involve planning your meals and getting regular exercise. Some women need to take a hormone called insulin, or an oral hypoglycemic medication to help control their blood sugar.    Making Plans for Feeding Your Baby  By this point, you probably have read a lot about feeding your baby. Breastfeed or formula? Each mother s decision is her own and we respect you and your choices. We ve gathered information on both breastfeeding and formula feeding to help with your decision. Talking with your physician can help in your decision.     Skin-to-skin contact  Being close to mom helps your baby adjust to life outside of the womb. It helps your baby regulate their body temperature, heart rate, and breathing. Your baby will usually be placed skin-to-skin immediately following birth or as soon as possible, if medical intervention is needed.    Rooming-In  Having your baby stay with you in your room is called  rooming-in.  Keeping your baby in your room helps you to learn how to care for your baby by getting to know your baby s cues, body rhythms and sleep cycle.       Cue-based feeding  Cues (signals) are baby s way of telling you what he or she wants. When you learn your infant s cues, you know how to care for and feed your baby. Feeding cues are the licking and smacking of lips, bringing their fist to their mouth, and a reflex called  rooting - where baby turns and opens his or her mouth, searching for the breast or bottle. Crying is a late feeding cue. Babies can feed frequently, often at least 8 times in 24 hours.  Breastfeeding facts  Breast milk is the best source of nutrition for your baby and is available at birth. In the first couple of days, your milk volume is already starting to increase, though it may not be noticeable. Breastfeeding frequently to increase your milk supply. Within three to five days, you will begin to notice larger milk volumes. An increase in breast size, heaviness and firmness are  often described as the milk  coming in.  Frequent breastfeeding can help breasts from getting overly firm and painful. You will know the baby is getting enough milk if your baby is having wet and dirty diapers and gaining weight.   Positioning and attachment   Get comfortable. Use pillows as needed to support your arms and baby. Hold baby close at the level of your breast, facing you in a tummy to tummy position. Skin to skin helps with this. Position the baby with his or her nose by the nipple. There should be a straight line from baby s ear to shoulder to hips.  Tickle your baby s lips or wait for baby to open mouth wide, bring baby to breast by leading with the chin. Aim the nipple at the roof of baby s mouth. A rapid sucking pattern is followed by longer, drawing pattern with occasional swallows heard. When baby is correctly latched, your nipple and much of the areola are pulled well into baby s mouth.      Returning to work or school  Focus on a good start to breastfeeding. Many women continue to provide breastmilk for their baby when they return to work or school. Making plans about where to pump and store milk can make the transition go well. Talk with other mothers who have also returned to work or school for tips and support. Your employer s Human Resource department may be a resource as well.        babies can mean fewer  sick  days for you.    A quality breast pump will also save time and add comfort. Check with your insurance prior to giving birth for breast pump coverage. Many insurance companies include a pump within your benefits.    Breastfeed when you are with your baby. Reserve your bottles of breast milk for when you are away.     Your breasts will need to be  emptied  either by your baby or a pump. Plan to pump at least twice in an eight hour day.    If you cannot pump at work, continue breastfeeding at home. Any amount of breast milk is worth giving to your baby.    Formula feeding  facts  If you are planning to use formula to feed your baby, you will want to make some preparations ahead of time. Talk to your doctor about what type of formula to use. Some are iron-fortified, meaning they have extra iron in them. You will want to purchase formula and bottles before your baby is born to be sure you are ready after you return from the hospital. The hospitals do not provide formula samples to take home.    Be sure to follow formula mixing directions closely. Regular milk in the dairy case at the grocery store should not be given to babies under 1 year old. Baby formula is sold in several forms including:    Ready-to-use. This is the most expensive, but no mixing is necessary.    Concentrated liquid. This is less expensive than ready-to-use and you mix with water.    Powder. This is the least expensive. You mix one level scoop of powdered formula with two ounces of water and stir well.    How do I warm my baby s bottles?  You may feed your baby a bottle without warming it first. It is OK for the breast milk or formula to be cool or room temperature. If your baby seems to prefer it warmed, you can put the filled bottle in a container of warm water and let it stand for a few minutes. Check the temperature of the liquid on your skin before feeding it to your baby; to be sure it isn t too hot. Do not heat bottles in the microwave. Microwaves heat food and liquids unevenly, and this can cause hot spots that can burn your baby.    How do I clean and sterilize bottles?  Sterilize bottles and nipples before you use them for the first time. You can do this by putting them in boiling water for 5 minutes. After that first time, you can wash them in hot and soapy water. Rinse them carefully to be sure there is no soap left on them. You can also wash them in the .

## 2021-05-31 NOTE — TELEPHONE ENCOUNTER
----- Message from Taty Kilpatrick CMA sent at 8/7/2019 11:34 AM CDT -----      ----- Message -----  From: Yoon Villafuerte MD  Sent: 8/7/2019  11:23 AM  To: Yoon Villafuerte Care Team Pool    Please call pt with test result.    Branden Cano,    You tested negative for syphilis as we expected.    Thanks,  Dr Villafuerte

## 2021-05-31 NOTE — TELEPHONE ENCOUNTER
Refill Approved    Rx renewed per Medication Renewal Policy. Medication was last renewed on 5/7/19.    Lori Guillory, [facility] Triage/Med Refill 8/30/2019     Requested Prescriptions   Pending Prescriptions Disp Refills     escitalopram oxalate (LEXAPRO) 10 MG tablet 90 tablet 1     Sig: Take 3 tablets (30 mg total) by mouth daily.       SSRI Refill Protocol  Passed - 8/30/2019  4:16 PM        Passed - PCP or prescribing provider visit in last year     Last office visit with prescriber/PCP: 3/25/2019 Norma Green CNP OR same dept: 4/23/2019 Malu Yuan MD OR same specialty: 4/23/2019 Malu Yuan MD  Last physical: Visit date not found Last MTM visit: Visit date not found   Next visit within 3 mo: Visit date not found  Next physical within 3 mo: Visit date not found  Prescriber OR PCP: Norma Green CNP  Last diagnosis associated with med order: 1. Moderate episode of recurrent major depressive disorder (H)  - escitalopram oxalate (LEXAPRO) 10 MG tablet; Take 3 tablets (30 mg total) by mouth daily.  Dispense: 90 tablet; Refill: 1    2. Bipolar 2 disorder, major depressive episode (H)  - escitalopram oxalate (LEXAPRO) 10 MG tablet; Take 3 tablets (30 mg total) by mouth daily.  Dispense: 90 tablet; Refill: 1    3. Chronic post-traumatic stress disorder (PTSD)  - escitalopram oxalate (LEXAPRO) 10 MG tablet; Take 3 tablets (30 mg total) by mouth daily.  Dispense: 90 tablet; Refill: 1    If protocol passes may refill for 12 months if within 3 months of last provider visit (or a total of 15 months).

## 2021-05-31 NOTE — TELEPHONE ENCOUNTER
Refill Request  Did you contact pharmacy: No  Medication name:   Requested Prescriptions     Pending Prescriptions Disp Refills     escitalopram oxalate (LEXAPRO) 10 MG tablet 90 tablet 1     Sig: Take 3 tablets (30 mg total) by mouth daily.     Who prescribed the medication:   Yoon Villafuerte MD  Pharmacy Name and Location:   [id number]  Is patient out of medication: No.  unknown days left  Patient notified refills processed in 72 hours:  yes  Okay to leave a detailed message: yes

## 2021-05-31 NOTE — PROGRESS NOTES
Clinic Note - Return OB Visit    Jerome Avila is a 24 y.o.  female who presents to clinic for a follow up OB visit. She is currently 26w0d with an estimated date of delivery of 1219 via  [facility], [place]. She denies headache, chest pain, shortness of breath, abdominal pain/contractions, vaginal bleeding, or abnormal discharge. She has felt baby move.  Expecting baby girl.    New concerns today:   -leg restlessness has improved/almost resolved  -hx depression, bipolar, PTSD: currenly on lexapro 30mg daily - doing well at this time, not currently doing therapy now but has in the past   -hx opioid dependence: hx pills for a short time, but didn't do any treatment  -hx alcohol use: no alcohol since finding out was pregnant  -Patient does use e-cigarettes daily - still using, same as last time, just occasionally, trying to use lowest nicotine level or even no nicotine     OB History    Para Term  AB Living   1             SAB TAB Ectopic Multiple Live Births                  # Outcome Date GA Lbr Tyron/2nd Weight Sex Delivery Anes PTL Lv   1 Current                Physical exam:  BP 98/62   Wt 150 lb (68 kg)   LMP 02/10/2019 (Approximate)   BMI 26.57 kg/m      General: alert, female in no acute distress  Abdomen: gravid uterus appropriate for gestation age at 27 cm above pubic symphysis, non-tender, FHTs 135  Extremities: no edema    Assessment/Plan:  1. Encounter for supervision of normal first pregnancy in second trimester  G1 at 26 weeks today.  Overall doing well at this time. Risks during this pregnancy include: Mental health issues on SSRI therapy, history of opioid and alcohol abuse now in remission, current occasional e-cigarette use.  Genetic testing was normal, expecting baby girl, maternal negative CF carrier.  Fetal survey normal.  Labs as below.  Tdap next visit.  - RPR  - Glucose,Gestational Challenge (1 Hour)  - HM2(CBC w/o Differential)    2. Moderate episode of recurrent major  depressive disorder (H)  3. Bipolar 2 disorder, major depressive episode (H)  4. Chronic post-traumatic stress disorder (PTSD)  Patient reports stable/manageable mood symptoms at this time, will continue Lexapro 30 mg daily.    5. Severe opioid dependence in sustained remission (H)  6. Severe alcohol use disorder (H)  Continued remission from these substances reported.    7. Electronic cigarette use  Patient continues to use E cigarettes intermittently but has cut down during pregnancy. Again discussed potential risks of nicotine during pregnancy and encourage cessation.      Information given on gestational diabetes. 1 hour glucose tolerance test today, in addition to CBC and syphilis.   TDAP immunization to be given at next visit  Blood type B pos. Rhogam not indicated  Discussed signs and symptoms of  labor.     Follow up in 4 weeks for routine prenatal visit.     Yoon Villafuerte MD

## 2021-05-31 NOTE — PATIENT INSTRUCTIONS - HE
Phone Numbers:  [facility]: [phone]  [facility]: [phone]     When to call or come in to the hospital   If you notice a decrease in your baby's movement.   If your begin to experience contractions that are 5 minutes or less apart and lasting for over 45 seconds, or if contractions are becoming more painful.  If you have any bleeding or leakage of fluids.   If you have a headache not resolved with tylenol, right upper abdominal pain, or sudden onset of swelling.  You know your body best. Never hesitate to call or go to the hospital if something doesn't feel right!    After-baby Birth Control Methods   It is important to consider contraception after your baby is born if you would like to prevent a pregnancy in the near future. If you are breast feeding, talk with your doctor to determine the best method for you. It is recommended that you wait 12 months after the birth of your baby to get pregnant again.   Natural Family Planning  It is possible to avoid pregnancy or time them based on your family goals without any hormones or medications or need for condoms. In order to be successful with this method, both partners need to be diligent in tracking fertility/ovulation and abiding by abstinence during certain times of the month to avoid pregnancy.  Condoms   Latex condoms can prevent pregnancy and STDs. Condoms work best when used with spermicide that is placed inside the vagina as well as inside the condom. Use only water-based lubricants. Petroleum based products (such as Vaseline and many massage oils) can weaken the latex and cause it to break.   IUD   IUD's are made of flexible plastic and must be inserted into your uterus by a doctor. The IUD works by stopping the fertilized egg from attaching itself to the uterus. IUDs may increase the risk of getting a pelvic infection (PID), which can lead to infertility if not diagnosed and treated early. This is a great option after delivery of your baby! These  last for 3, 5, or 10 years depending up on which type you choose, but can be removed earlier if you decide you would like to get pregnant sooner.  Tubal Ligation & Vasectomy   These are good choices for women and men who know that they do not want to have any more children.     HORMONES   Birth control pills, hormone implants and shots work by stopping ovulation (release of the egg from the ovary). Implants are placed in the upper arm by a minor surgical incision. They last for three years, but can be removed by your doctor if you decide to get pregnant. Hormone injections must be repeated every three months. The Pill must be taken every day.   All hormones can have side effects and create certain health risks. They are very effective in preventing pregnancy but they do not prevent STDs. You can talk more about the risks and benefits with your doctor.     Controlling Back Pain  As your body changes during pregnancy, your back must work in new ways. Back pain is due to many causes. Physical changes in your body can strain your back and its supporting muscles. Also, hormones (chemicals that carry messages throughout the body) increase during pregnancy. This can affect how the muscles and joints work together. All of these changes can lead to pain in the upper or lower back or pelvis. Some pregnant women have sciatica, pain caused by pressure on the sciatic nerve running down the back of the leg. Ask your healthcare provider for specific tips and exercises to help control your back pain.    Tips to Help You Rest  Good rest and sleep will help you feel better. Here are some ideas:  Ask your partner to massage your shoulders, neck, or back.  Limit the errands you do each day.  Lie down in the afternoon or after work for a few minutes.  Take a warm bath before you go to sleep.  Drink warm milk or teas without caffeine.  Avoid coffee, black tea, and cola.    Preventing Heartburn  Avoid spicy or acidic foods.   Eat small  amounts more often.  Wait 2 hours after eating before lying down   Sleep with your upper body raised 6 inches.  May use Tums as needed. Talk to your doctor about other medications to try.

## 2021-05-31 NOTE — PROGRESS NOTES
Clinic Note - Return OB Visit    Jerome Avila is a 24 y.o.  female who presents to clinic for a follow up OB visit. She is currently 30w0d with an estimated date of delivery of 1219 via  [facility]. She denies headache, chest pain, shortness of breath, abdominal pain/contractions, vaginal bleeding, or abnormal discharge. She has felt baby move.     New concerns today:   -having discharge, more than previous, no unusual color change, intermittently really thin; real minor cramps  -hx depression, bipolar, PTSD: currenly on lexapro 30mg daily - doing well at this time, not currently doing therapy now but has in the past; feeling tired lately so mood a little worse  -hx opioid dependence: hx pills for a short time, but didn't do any treatment  -hx alcohol use: no alcohol since finding out was pregnant  -Patient does use e-cigarettes daily - still using, same as last time, daily but less than pre-pregnancy, trying to use lowest nicotine level     OB History    Para Term  AB Living   1             SAB TAB Ectopic Multiple Live Births                  # Outcome Date GA Lbr Tyron/2nd Weight Sex Delivery Anes PTL Lv   1 Current                Physical exam:  /60   Wt 150 lb (68 kg)   LMP 02/10/2019 (Approximate)   BMI 26.57 kg/m      General: alert, female in no acute distress  Abdomen: gravid uterus appropriate for gestation age at 29 cm above pubic symphysis, non-tender, FHTs 155  Extremities: no edema    Assessment/Plan:  1. Encounter for supervision of normal first pregnancy in third trimester  G1 at 30 wks today. Risks during pregnancy include: mental health issues on SSRI therapy, history of opioid and alcohol abuse now in remission, current e-cigarette use.  Genetic testing was normal, expecting baby girl, maternal negative CF carrier. Tdap administered today. Essentially no weight gain during pregnancy - will discuss further at next visit.    2. Moderate episode of recurrent major  depressive disorder (H)  3. Bipolar 2 disorder, major depressive episode (H)  4. Chronic post-traumatic stress disorder (PTSD)  Patient reports stable/manageable mood symptoms at this time, will continue Lexapro 30 mg daily.    5. Severe alcohol use disorder (H)  6. Severe opioid dependence in sustained remission (H)  Continued remission from these substances reported.    7. Electronic cigarette use  Patient continues to use E cigarettes. Again discussed potential risks of nicotine during pregnancy and encourage cessation.      Reviewed postpartum contraception options.     Follow up in 3 weeks for routine prenatal visit.     Yoon Villafuerte MD

## 2021-06-01 NOTE — PROGRESS NOTES
Clinic Note - Return OB Visit    Jerome Avila is a 24 y.o.  female who presents to clinic for a follow up OB visit. She is currently 33w0d with an estimated date of delivery of 1219 via  tri . She denies headache, chest pain, shortness of breath, abdominal pain/contractions, vaginal bleeding, or abnormal discharge. She has felt baby move.     New concerns today:   -very mild cramping, once/day, short-lived   -hx depression, bipolar, PTSD: currenly on lexapro 30mg daily - doing well at this time, not currently doing therapy now but has in the past; no concerns at this time, reports stable, PHQ9/GAD7 done  -hx opioid dependence: hx pills for a short time, but didn't do any treatment  -hx alcohol use: no alcohol since finding out was pregnant  -not using e-cigarettes anymore    OB History    Para Term  AB Living   1             SAB TAB Ectopic Multiple Live Births                  # Outcome Date GA Lbr Tyron/2nd Weight Sex Delivery Anes PTL Lv   1 Current                Physical exam:  /60   Wt 158 lb 9 oz (71.9 kg)   LMP 02/10/2019 (Approximate)   BMI 28.09 kg/m      General: alert, female in no acute distress  Abdomen: gravid uterus appropriate for gestation age at 34 cm above pubic symphysis, non-tender, FHTs 130  Extremities: no edema    Assessment/Plan:  1. Encounter for supervision of normal first pregnancy in third trimester  G1 at 33 wks today. Risks during pregnancy include: mental health issues on SSRI therapy, history of opioid and alcohol abuse now in remission, current e-cigarette use.  Genetic testing was normal, expecting baby girl, maternal negative CF carrier. Tdap administered previously, flu shot administered today. Better weight gain now and baby measuring appropriately - will recheck in 2 weeks, if needed will do growth US.    2. Moderate episode of recurrent major depressive disorder (H)  3. Bipolar 2 disorder, major depressive episode (H)  4. Chronic  post-traumatic stress disorder (PTSD)  Patient reports stable/manageable mood symptoms at this time, will continue Lexapro 30 mg daily.    5. Severe alcohol use disorder (H)  6. Severe opioid dependence in sustained remission (H)  Continued remission from these substances reported. Pt shares that she has also stopped e-cig/nicotine use - congratulated her on this!      Follow up in 2 weeks for routine prenatal visit.     Yoon Villafuerte MD

## 2021-06-01 NOTE — PATIENT INSTRUCTIONS - HE
Phone Numbers:  [facility]: [phone]  [facility]: [phone]     When to call or come in to the hospital  If you notice a decrease in your baby's movement.   If your begin to experience contractions that are 5 minutes or less apart and lasting for over 45 seconds, or if contractions are becoming more painful.  If you have any bleeding or leakage of fluids.   If you have a headache not resolved with tylenol, right upper abdominal pain, or sudden onset of swelling.  You know your body best. Never hesitate to call or go to the hospital if something doesn't feel right!    Preparing for the hospital  You re likely feeling anxious as your child s birth approaches. This is normal. To give yourself some peace of mind, pack a bag 3-4 weeks before your due date. Here is a list of things to remember:  Personal care items like toothbrush, hair brush, lip balm, lotion, shampoo, glasses, contacts  Nightgown, bathrobe, slippers  Several pairs of underwear  Comfortable clothes for you to wear home  Camera with new batteries  Cell phone and   Insurance information and any other paperwork needed for your hospital stay  Clothes for baby to wear home  An infant, rear-facing car seat for bringing home your baby (this is required by law)    Managing Labor Pain   There are many ways to manage pain during labor. It can often be done with no anesthesia or strong pain medications. Talk to your health care provider about any options you would like to explore.     Help from Relaxation  Some of these are learned in special classes. Your health care provider can help you find classes. The hospital has a tub you can use during early labor. These methods may be of help to you:   Breathing techniques   A warm tub between contractions   Massage and therapeutic touch by your support person or labor    Reading materials that are comforting or inspiring   Music that is soothing   Hypnosis   Acupuncture and acupressure   Heat and  cold application    Help From Analgesics   Analgesics are mild medications that reduce pain. They can be used along with some relaxation methods. They can give you pain relief without total loss of feeling. They may lessen the pain of strong contractions. You may feel well enough to nap between contractions. They have little effect on your baby if given early in labor. This may be done by injection or by IV.     Help From Anesthetics   Anesthesia involves blockage of all feeling including pain. It can be given in the form of local anesthesia or general anesthesia.  Anesthesia is a type of medication to prevent pain. It is often used in labor. It may numb only one region of your body. This is called regional anesthesia. Or it may let you sleep during surgery. This is called general anesthesia. This type of medication is given by a trained specialist. When possible, regional anesthesia will be used. This is so you can be awake during your baby s birth.     Regional Anesthesia   Regional anesthesia may be used to numb your lower body for a vaginal or  birth. It does not go into your bloodstream. This means that little or none of it will reach your baby. There are two kinds:   Epidural. This is most often given while you sit up or lie on your side. A needle with a flexible tube (catheter) is put in your lower back. The needle is then removed. The anesthetic is sent through the catheter. A pump may be attached. This gives you a constant level of anesthetic. An epidural often only partly affects muscle control. This means you will still be able to push for a vaginal birth.   Spinal. This is most often given in one dose right before delivery. It acts fast. You may sit up or lie down when it is injected. It may affect muscle control in your lower body. This includes the ability to push.    General Anesthesia   General anesthesia lets you sleep and keeps you free of pain during surgery. It may be used for a .  It may be given as an injection. It may be given as an inhaled gas. Or it may be given as both. Delivery often occurs before the medication has reached the baby.

## 2021-06-02 VITALS — WEIGHT: 154.2 LBS | HEIGHT: 63 IN | BODY MASS INDEX: 27.32 KG/M2

## 2021-06-02 VITALS — HEIGHT: 63 IN | BODY MASS INDEX: 27.59 KG/M2 | WEIGHT: 155.7 LBS

## 2021-06-02 NOTE — PATIENT INSTRUCTIONS - HE
Phone Numbers:  [facility]: [phone]  [facility]: [phone]     When to call or come in to the hospital  If you notice a decrease in your baby's movement.   If your begin to experience contractions that are 5 minutes or less apart and lasting for over 45 seconds, or if contractions are becoming more painful.  If you have any bleeding or leakage of fluids.   If you have a headache not resolved with tylenol, right upper abdominal pain, or sudden onset of swelling.  You know your body best. Never hesitate to call or go to the hospital if something doesn't feel right!    Preparing for the hospital  You re likely feeling anxious as your child s birth approaches. This is normal. To give yourself some peace of mind, pack a bag 3-4 weeks before your due date. Here is a list of things to remember:  Personal care items like toothbrush, hair brush, lip balm, lotion, shampoo, glasses, contacts  Nightgown, bathrobe, slippers  Several pairs of underwear  Comfortable clothes for you to wear home  Camera with new batteries  Cell phone and   Insurance information and any other paperwork needed for your hospital stay  Clothes for baby to wear home  An infant, rear-facing car seat for bringing home your baby (this is required by law)

## 2021-06-02 NOTE — PROGRESS NOTES
"Clinic Note - Return OB Visit    Jerome Avila is a 24 y.o.  female who presents to clinic for a follow up OB visit. She is currently 35w0d with an estimated date of delivery of 1219 via  tri . She denies headache, chest pain, shortness of breath, abdominal pain/contractions, vaginal bleeding, or abnormal discharge. She has felt baby move.  Expecting baby girl.    New concerns today:   -mood at a stable spot now  -still not using e-cigs  -gained 3 lbs since last visit    OB History    Para Term  AB Living   1             SAB TAB Ectopic Multiple Live Births                  # Outcome Date GA Lbr Tyron/2nd Weight Sex Delivery Anes PTL Lv   1 Current                Physical exam:  /62   Pulse 77   Temp 98.1  F (36.7  C) (Oral)   Resp 16   Ht 5' 4\" (1.626 m)   Wt 161 lb (73 kg)   LMP 02/10/2019 (Approximate)   SpO2 98%   BMI 27.64 kg/m      General: alert, female in no acute distress  Abdomen: gravid uterus at 34 cm above pubic symphysis - unchanged from last visit, non-tender, FHTs 135  Extremities: no edema    Assessment/Plan:  1. Encounter for supervision of normal first pregnancy in third trimester  G1 at 35 wks today. Risks during pregnancy include: mental health issues on SSRI therapy, history of opioid and alcohol abuse now in remission, e-cigarette use during pregnancy.  Genetic testing was normal, expecting baby girl, maternal negative CF carrier. Tdap and flu shot administered during pregnancy. Weight gain improving but still only 10 lbs gained thus far during pregnancy and fundal heigh unchanged from last visit - will evaluate further with [employer] US, ordered today.    2. Moderate episode of recurrent major depressive disorder (H)  3. Bipolar 2 disorder, major depressive episode (H)  4. Chronic post-traumatic stress disorder (PTSD)  Patient reports stable/manageable mood symptoms at this time, will continue Lexapro 30 mg daily. PHQ9 score 8 today.    5. History of " alcohol abuse  6. Hx of opioid abuse (H)  Continued remission from these substances reported. Continued cessation from nicotine use.      Plans for contraception (pt deciding between nexplanon and mirena IUD), breastfeeding reviewed. Reviewed delayed cord clamping, rooming in, skin to skin.  Discussed preferences during labor.     Follow up in 1 week for routine prenatal care.    Yoon Villafuerte MD

## 2021-06-02 NOTE — PATIENT INSTRUCTIONS - HE
Phone Numbers:  [facility]: [phone]  [facility]: [phone]     When to call or come in to the hospital  If you notice a decrease in your baby's movement.   If your begin to experience contractions that are 5 minutes or less apart and lasting for over 45 seconds, or if contractions are becoming more painful.  If you have any bleeding or leakage of fluids.   If you have a headache not resolved with tylenol, right upper abdominal pain, or sudden onset of swelling.  You know your body best. Never hesitate to call or go to the hospital if something doesn't feel right!    Preparing for the hospital  You re likely feeling anxious as your child s birth approaches. This is normal. To give yourself some peace of mind, pack a bag 3-4 weeks before your due date. Here is a list of things to remember:  Personal care items like toothbrush, hair brush, lip balm, lotion, shampoo, glasses, contacts  Nightgown, bathrobe, slippers  Several pairs of underwear  Comfortable clothes for you to wear home  Camera with new batteries  Cell phone and   Insurance information and any other paperwork needed for your hospital stay  Clothes for baby to wear home  An infant, rear-facing car seat for bringing home your baby (this is required by law)    What Is Group B Strep?   Group B strep (streptococcus) is a common bacteria. It can grow in a woman s vagina, rectum, or urinary tract. It is almost always harmless in adults. But in rare cases, a woman who has group B strep can infect her baby during the birth. Infection can cause serious illness in the . The good news is that treating the mother during labor reduces the risk of the baby becoming infected. And if a  is infected, the infection can be treated. You will be screened for Group B strep at 35-36 weeks gestation.    Facts about group B strep   Learning more about group B strep can help you understand how testing and treatment can help. Here are some basic  facts about group B strep:   It is not a sexually transmitted disease.   It is not the same as strep throat. (This is caused by group A strep.)   It often has no symptoms and may cause no problems in adults.   Group B strep can be transmitted during vaginal delivery. It cannot be passed during  (surgical) birth.   A mother with group B strep rarely infects her . (Infection occurs only about 1% to 2% of the time.)   When a mother is treated during labor and delivery, her baby almost never becomes infected.   Certain factors during pregnancy increase the risk of a baby becoming infected.    Possible effects on your baby   Group B strep can infect the blood. It can also cause inflammation of the baby s lungs, brain, or spinal cord. Long-term effects can include blindness, deafness, mental retardation, or cerebral palsy. And in rare cases, infection causes death. Infection is most often detected soon after the baby is born.     How your baby may become infected   Group B strep often lives in the vagina or rectum. If the amniotic sac breaks early, bacteria from the vagina can travel to the uterus, reaching the baby. Or, as the baby passes through the birth canal, it can come in contact with the bacteria. In rare cases, group B strep can also be passed to the baby after delivery. This is called late-onset group B strep. The source of this type of infection is not well understood. But some experts believe that it happens if the baby is exposed to group B strep in the home, from the parents or siblings, or in the community.     What increases the risk?   Certain risk factors increase the chance that a baby will be infected. They include:   Breaking or leaking of the amniotic sac earlier than 37 weeks gestation   Labor earlier than 37 weeks gestation   Breaking of the amniotic sac more than 18 hours before labor begins   Fever during labor   A urinary tract infection with group B strep at any point in the  pregnancy   A previous baby born with a group B strep infection    Baby Feeding in the Hospital: Information, Support and      Resources    As you prepare for the birth of your child, you will want to consider options for feeding your baby cluding breast-feeding and/or baby formula. The American Academy of Pediatrics recommends exclusive breast-feeding for the first six months (although any amount of breast-feeding is beneficial).  However, we also understand that breast-feeding is  a personal choice and not for everyone. Whether or not you choose to breast-feed, your decision will be respected by our staff.        There are numerous benefits of breast-feeding; here are a few to consider:    Provides antibodies to protect your baby from infections and diseases    The cost: formula can cost over $1,500 per year    Convenience, no warming up or sterilizing bottles and supplies    The physical contact with breastfeeding can make babies feel secure, warm and comforted    Whatever my feeding choice, what can I expect after I deliver my baby?    Your baby will usually be placed skin-to-skin immediately following birth. The skin to skin contact between you and your baby will be a special and memorable time. The bonding and attachment comforts your baby and has a positive effect on baby s brain development.     Having your baby  room in  with you also helps you start to learn your baby s body rhythms and sleep cycle.      You will also begin to learn your baby s cues (signals) that he or she is ready to feed.    When do I start to feed my baby?   As soon as possible after your baby s birth, you will be encouraged to begin feeding. In the first couple of weeks, your baby will eat often. Breastfeeding babies usually eat at least 8 times in 24 hours. Babies fed formula usually eat at least 7 times in 24 hours.      Breast-feeding tips:    Get comfortable and use pillows for support.    Have your baby at the level of your  breast, facing you,  tummy to tummy.       Touch your nipple to your baby s lips so your baby s mouth opens wide (rooting reflex).  Aim the nipple toward the roof of your baby s mouth. When your baby opens his or her mouth, pull your baby toward your breast to help your baby  latch on  to your nipple and much of the areola area.    Hand expressing your breast milk can assist with latching your baby to your breast, if needed.    Ask for help, breastfeeding may seem awkward or uncomfortable at first, this is normal. There are numerous resources available.    Mixing breastfeeding and formula can interfere with how you begin building your milk supply. It can impact how you and your baby learn to breastfeeding together and alter the natural growth of  good  bacteria in your baby s stomach.    Ask your nurse to show you how to hand express.     Breast milk can be kept in the refrigerator orfreezer for later use.     Hospital Resources:  [facility] located at [facility], [facility] and [facility]  Call: [phone].    Inpatient support    Outpatient appointments    Telephone consultation    Breast-feeding classes available through [employer]      Online Resources:    [email or web address] sign up for free online weekly e-mail    [email or web address]    [email or web address]    Llli.org (La Leche League)    [email or web address].[email or web address]    [email or web address]    [email or web address]    Breast-feeding Supplies & Pumps:  Talk to your insurance provider or WIC (Women, Infants and Children) to learn more about options available to you. Recent health insurance changes may include additional coverage for supplies and pumps.    Public Health:  Women, Infants and Children Nutrition program (WIC): provides breast-feeding support and education in addition to formal feeding moms. 865-FYB-8311 or [email or web address]    Family Health Home Visiting:  Public Health Nurse home visits are available. Talk to your provider to see if you qualify. Most [facility] have a program available.    Additional Resources:  La Leche League is an international, nonprofit, nonsectarian organization offering information, education, and support to mothers who want to breast-feed their babies. Local groups offer phone help and monthly meetings. Visit [employer].org or [employer].org and us the  Find local support  drop down menu or click on the  Resources  tab.    [place] Breastfeeding Resources: 3-417-138-BABY (7429) toll free    National Breastfeeding Help Line trained breastfeeding peer counselors can help answer common breast-feeding questions by phone. Monday-Friday: English/Yi  8-514- [phone] toll free, [phone] (TTY)

## 2021-06-02 NOTE — PROGRESS NOTES
Clinic Note - Return OB Visit    Jerome Avila is a 24 y.o.  female who presents to clinic for a follow up OB visit. She is currently 37w0d with an estimated date of delivery of 1219 via  [facility]. She denies headache, chest pain, shortness of breath, abdominal pain/contractions, vaginal bleeding, or abnormal discharge. She has felt baby move.     New concerns today:   -mild head cold - congestion, sore throat - past week, improving, tylenol occasionally    OB History    Para Term  AB Living   1             SAB TAB Ectopic Multiple Live Births                  # Outcome Date GA Lbr Tyron/2nd Weight Sex Delivery Anes PTL Lv   1 Current                Physical exam:  /60   Wt 165 lb 1 oz (74.9 kg)   LMP 02/10/2019 (Approximate)   BMI 28.33 kg/m      General: alert, female in no acute distress  Abdomen: gravid uterus appropriate for gestation age at 36 cm above pubic symphysis, non-tender, FHTs 145, Leopold's maneuver reveals vertex positioning  Extremities: no edema    Assessment/Plan:  1. Encounter for supervision of normal first pregnancy in third trimester  G1 at 37 weeks today. Risks during pregnancy include: mental health issues on SSRI therapy, history of opioid and alcohol abuse now in remission, e-cigarette use during pregnancy.  Genetic testing was normal, expecting baby girl, maternal negative CF carrier. Tdap and flu shot administered during pregnancy. MFM referral placed for evaluation of low weight gain and small growth on our US - normal interval growth, no IUGR, normal US evaluation. GBS negative.    2. Moderate episode of recurrent major depressive disorder (H)  3. Bipolar 2 disorder, major depressive episode (H)  4. Chronic post-traumatic stress disorder (PTSD)  Patient reports stable/manageable mood symptoms at this time, will continue Lexapro 30 mg daily. PHQ9 score 3 today.    5. History of alcohol abuse  6. Hx of opioid abuse (H)  Continued remission from these  substances reported. Continued cessation from nicotine use.      GBS negative.  Reviewed signs/symptoms of labor and when to present to the hospital.    Follow up in 1 week for routine prenatal visit, sooner if labor symptoms.    Yoon Villafuerte MD

## 2021-06-02 NOTE — PATIENT INSTRUCTIONS - HE
Patient Education   HEALTHY PREGNANCY CARE: 34-36 WEEKS PREGNANT    Your baby is gaining about an ounce each day, so healthy nutrition and rest are still very important. Learn about late pregnancy symptoms, such as constipation and backaches. Drinking more fluids and eating more fiber can relieve constipation. The pelvic tilt and a heating pad can ease backaches.    Continue to watch for signs and symptoms of preeclampsia:     Sudden swelling of your face, hands, or feet     New vision problems such as blurring, double vision, or flashing lights    A severe headache not relieved with acetaminophen (Tylenol)    Sharp or stabbing pain in your right or middle upper abdomen    You're almost done with your pregnancy but it's still too soon to have your baby. The goal is to have your baby after 37 weeks, so watch for signs and symptoms of premature labor:     Regular contractions. This means having about 6 or more within 1 hour, even after you have had a glass of water and are resting.     A backache that starts and stops regularly.    An increase or change in vaginal discharge, such as heavy, mucus-like, watery, or bloody discharge.     Your water breaks or leaks.    You will be tested for group B streptococcus (GBS), a type of bacteria found in 10-30% of pregnant women. A woman with GBS can pass it to her baby during delivery. Most babies who get GBS from their mothers do not have any problems, but some babies get very ill and need to be hospitalized for antibiotic treatment. Treating you with antibiotics during labor and delivery helps to prevent infection in your baby.    Review information on postpartum care that you will need after the baby is born.  Discuss your choices and plans for birth control with your midwife or physician.     Postpartum Care  During the days and weeks after the delivery of your baby (postpartum period), your body will change as it returns to its nonpregnant condition. As with pregnancy  "changes, postpartum changes are different for every woman.    Physical changes after childbirth  The changes in your body may include:    Contractions called afterpains shrink the uterus for several days after childbirth. Shrinking of the uterus to its prepregnancy size may take 6 to 8 weeks.    Sore muscles (especially in the arms, neck, or jaw) are common after childbirth. This is because of the hard work of labor and pushing your baby out. The soreness should go away in a few days.    Bleeding and vaginal discharge (lochia) may last for 2 to 8 weeks, and can come and go for about 2 months.    Vaginal soreness, including pain, discomfort, and numbness, is common after vaginal birth. Soreness may be worse if you had a perineal tear or episiotomy.    If you had a  birth (), you may have pain in your lower abdomen and may need pain medicine for 1 to 2 weeks.    Breast engorgement is common around the 3rd or 4th day after delivery, when the breasts begin to fill with milk. This can cause discomfort and swelling. If you are breast feeding, the best treatment is to feed your baby often or pump the milk out. You can also use warm compresses. If you are not breast feeding, placing ice packs on your breasts, taking a hot shower, or using warm compresses may relieve the discomfort.    Be aware of postpartum depression    \"Baby blues\" are common for the first 1 to 2 weeks after birth. You may cry or feel sad or irritable for no reason.     For some women, these feelings last longer and are more intense. This is called postpartum depression.     If your symptoms last for more than a few weeks or you feel very depressed, ask your midwife or physician for help.     Postpartum depression can be treated. Support groups and counseling can help, but sometimes medication is needed.     Discuss follow-up appointments with your midwife or physician, as well. He or she will usually want to see you 6 weeks after the " birth of your baby, sooner if you are having problems.    If you have questions about any symptoms you are experiencing or any other concerns, call your provider or their clinic staff at [facility] MEDICINE/OB at Phone: [phone]. If it's after clinic hours, physician patients should call the Care Connection at [phone] ([phone]); midwife patients should call their answering service at [phone].    How can you care for yourself at home?   You can refer to the Starting Out Right book or find it online at http://www.[facility].org/images/stories/http://www.[email or web address]/images/stories/maternity/[facility]-[email or web address]-Right.pdf or http://www.[facility].org/images/stories/flipbooks/[facility]-starting-out-right/[facility]-starting-out-right.html#p=8     You can sign up for a weekly parenting e-mail that gives support, tips and advice from health care professionals that starts with pregnancy and continues through the toddler years. To register, go to [email or web address] at any time during your pregnancy.    Making Early Breastfeeding or Chestfeeding Work: What's Important?  Breastfeeding/chestfeeding is important!     It helps keep babies healthy.    Parents who breastfeed/chestfeed have lower risks of breast and ovarian cancer.    It's convenient: the milk is always ready and warm, and there is nothing to mix or prepare for feeding.    Formula is harder for your baby to digest.    It helps you bond with your baby and protects against postpartum depression.  Lots of early skin-to-skin contact with your baby    Place your naked baby with baby's belly against your bare chest. Cover baby's back with a blanket    Start skin-to-skin right after birth, as soon as you are ready    Skin-to-skin:  ? Helps keep baby warm  ? Improves baby's oxygen and blood sugar levels  ? Helps your uterus contract and bleed less  ? Helps baby feel calm and comforted  ? Helps you feel close to baby  ? Helps get breastfeeding  "started. Being close makes latching on easier, and baby may move over to the nipple and latch without help  ? Baby breastfeeds better and longer when skin-to-skin  Placing baby well for good attachment to the breast or chest    Hold your baby close with baby's tummy touching your tummy.    Wait for baby to open mouth wide, then bring baby onto the breast/chest.    Baby should take a big mouthful of breast/chest, not just the nipple. This helps baby get more milk, and the suckling should feel comfortable.    When baby is latched well to the breast/chest, nipples aren't cracked or painful.  Keeping baby near (called \"rooming-in\" at the hospital)    Baby sleeps better and cries less when birth parent is near. Your room is quiet.    We will place your baby safely on their back in their bassinette. This lets you practice safe sleep for your baby while keeping them at your bedside.    Baby feeds more often, which means your milk supply increases faster, and your baby loses less weight.    Parents have an easier time getting to know and bonding with baby.    Parents feel much more confident about baby care and breastfeeding/chestfeeding.    Maternity staff can help at any time.  Feeding on cue    Feeding on cue simply means feeding whenever your baby shows signs of hunger    Crying is a late hunger sign. Feed baby whenever baby wants for as long as baby wants.    Feeding signs: mouth movements, sticking the tongue out, rooting (baby turns toward chest and may open mouth), hand-to-mouth movements    Advantages of feeding on cue:  ? Frequent breastfeeding/chestfeeding in the first weeks after birth gives you a good milk supply for months to come.  ? Babies settle into a relaxing feed more quickly. Babies enjoy feedings more when they don't have to cry to be fed.  ? Feeding is comfort as well as nutrition. Newborns love constant closeness and feeding and can't be held \"too much\" or \"spoiled.\"  ? Newborns need small frequent " "feedings in the first days of life. Just one to three teaspoons fill a new baby's stomach.  ? Responding to feeding cues helps babies gain weight.  Feeding only human milk in the first six months    Colostrum is the first milk that baby gets at birth. The amount of colostrum matches the baby's stomach, so it will not be overfilled.    The small volumes ready at birth are also easier for baby to handle.    All babies lose weight in the first few days. This is simply \"water weight.\"    Introducing food or fluids other than human milk too early can cause problems for breastfeeding/chestfeeding and for your baby's health.    Feeding only human milk maximizes the protection against disease and infections.    Your body knows how much milk to make by how often your baby feeds. If you give your baby formula, your body may not know how much milk to make.    For informational purposes only. Not to replace the advice of your health care provider. Adapted with permission from \"Getting Started with Infant Care and Breastfeeding,\" by BRITTANY Noonan, GIO, Sapphire Lucero, RN, IBCLC, Annie Medina MD, GIO, and Yasmine Lind MD, IBCLC. [place] [employer], 2017. Clinically reviewed by [facility]. [employer] [id number] - 05/19.        [facility] Breastfeeding Resources       Research shows that human milk offers the best  nutrition and protection for babies. So at [facility],  we care for families and babies in a way that  promotes, teaches and supports lactation. We  support all breastfeeding, chestfeeding and human  milk feeding families, as well as families who can t  breastfeed / chestfeed or who choose not to do so.  A mother or caregiver s own milk is best for a baby,  but if you are unable to breastfeed / chestfeed, we  may suggest pasteurized donor human milk for your  baby while in the hospital.    Lactation support    The following [facility] locations offer  individual outpatient " lactation consults. Some  locations offer phone or group lactation consults  with certified lactation consultants. Call to confirm  services and for information and appointments. You  may wish to call your insurance company first to see  if they will cover the cost of a consult.    [facility]: [phone]    [facility]: [phone]    [facility]: [phone]  Offers [place] First Days program, which includes  group lactation visits and one free information session  about delivering your baby at a designated [facility] hospital and the importance and benefits  of breastfeeding and human milk. These group visits  also help patients with feeding concerns and offer  information about other postpartum topics.                For informational purposes only. Not to replace the advice of your health care  provider. Copyright   2005 [facility]. All rights reserved. [employer] [id number] - REV .   [facility] ([place]):  [phone]    [facility]):  [phone] or [phone]    [facility]):  [phone]    [facility]  ([place]): [phone]    [facility] ([place]):  [phone] or [phone]  Includes follow-up visits for caregivers of babies  discharged from the  intensive care unit  (NICU) at [facility].    [facility]):  [phone]    [facility] ([facility],  [facility], primary care clinics):  [phone]  Also offers weight checks, feeding discussions and support with a lactation consultant as a part of free, weekly support group.    [facility]: [phone]  Also offers a free weekly support group.                                                                                                                                                                                                       (continued)   You may also call [facility] at [phone]  for information about [facility] and [facility]  locations that offer lactation support. For information  about breastfeeding / chestfeeding and childbirth  classes in the [facility], go to [facility] at www.[facility] Hospital[facility]. For [facility], go to [email or web address].[email or web address] and click on  Classes and Events at the bottom of the page. Or, call  [phone].    Supplies    You can get breast pumps from the Birthplace nurses  at [facility] or [facility] Center  nurses at [facility]. Call your health  insurance to see if they will cover the cost of the  pump. Tell your nurse you d like a pump. They will  help you fill out the right paperwork. The pump will  be ready for you when you leave the hospital.    If you decide to get a pump after you leave the  hospital, you can get one from our partners at  [facility]. [facility] carries a range of  feeding supplies and pumps. Please call your health  insurance to see if they will cover the cost of the  pump. Then call [facility]  to find out what supplies and pumps are available.  Some stores may deliver the pump to your home.    [facility]:  www.[email or web address].clipkit Other lactation services    [facility]iguel[facility] Ambrose: [phone] (24 hours a day)  www.[email or web address]  Offers support for breastfeeding / chestfeeding and  human milk feeding families. Call to find a group in  your area.    [facility]:  [phone]  www.womenshealth.gov/breastfeeding  Offers a breastfeeding / chestfeeding information  line in English and Thai.    WIC (Women, Infants and Children) Program:  [phone]  Offers breastfeeding / chestfeeding counseling. Call  to find an office near you.    Milk  banking    [facility]  [email or web address]  [phone]  Consider freezing your extra collected milk to donate  to babies in need. Email or call for information.                           If you are deaf or hard of hearing, please let us know. We provide many free services including  sign language interpreters, oral interpreters, TTYs, telephone amplifiers, note takers and written materials.

## 2021-06-02 NOTE — TELEPHONE ENCOUNTER
----- Message from Yoon Villafuerte MD sent at 10/9/2019  9:34 AM CDT -----  Please call pt with test result.    Branden Cano,    Your ultrasound results have returned. Baby is measuring small, less than the 10th percentile - this is concerning for potential growth restriction. I discussed this with the perinatologist specialist and we both agree that it would be beneficial to have a more detailed ultrasound at their office. You will get a phone call to schedule this. If baby does have growth restriction then we do more monitoring for the rest of your pregnancy and often deliver baby early.    Thanks  Dr Villafuerte

## 2021-06-02 NOTE — PROGRESS NOTES
Clinic Note - Return OB Visit    Jerome Avila is a 24 y.o.  female who presents to clinic for a follow up OB visit. She is currently 38w0d with an estimated date of delivery of 1219 via  tri US. She denies headache, chest pain, shortness of breath, abdominal pain/contractions, vaginal bleeding, or abnormal discharge. She has felt baby move.     New concerns today:   -none, head cold resolved    OB History    Para Term  AB Living   1             SAB TAB Ectopic Multiple Live Births                  # Outcome Date GA Lbr Tyron/2nd Weight Sex Delivery Anes PTL Lv   1 Current                Physical exam:  /60   Wt 165 lb 5 oz (75 kg)   LMP 02/10/2019 (Approximate)   BMI 28.38 kg/m      General: alert, female in no acute distress  Abdomen: gravid uterus appropriate for gestation age at 36 cm above pubic symphysis, non-tender, FHTs 1130, Leopold's maneuver reveals vertex positioning  Extremities: no edema    Assessment/Plan:  1. Encounter for supervision of normal first pregnancy in third trimester  G1 at 38 weeks today. Risks during pregnancy include: mental health issues on SSRI therapy, history of opioid and alcohol abuse now in remission, e-cigarette use during pregnancy.  Genetic testing was normal, expecting baby girl, maternal negative CF carrier. Tdap and flu shot administered during pregnancy. MFM referral placed for evaluation of low weight gain and small growth on our US - normal interval growth, no IUGR, normal US evaluation. GBS negative.    2. Moderate episode of recurrent major depressive disorder (H)  3. Bipolar 2 disorder, major depressive episode (H)  4. Chronic post-traumatic stress disorder (PTSD)  Patient reports stable/manageable mood symptoms at this time, will continue Lexapro 30 mg daily.     5. History of alcohol abuse  6. Hx of opioid abuse (H)  Continued remission from these substances reported. Continued cessation from nicotine use.      Reviewed  signs/symptoms of labor and when to present to the hospital.    Follow up in 1 week for routine prenatal visit, sooner if labor symptoms.    Yoon Villafuerte MD

## 2021-06-02 NOTE — PROGRESS NOTES
Clinic Note - Return OB Visit    Jerome Avila is a 24 y.o.  female who presents to clinic for a follow up OB visit. She is currently 36w0d with an estimated date of delivery of 1219 via  tri US. She denies headache, chest pain, shortness of breath, abdominal pain/contractions, vaginal bleeding, or abnormal discharge. She has felt baby move.  Expecting baby girl.    New concerns today:   -none  -MFM US normal - f/u scheduled to see fetal parts that were not visible but normal growth    OB History    Para Term  AB Living   1             SAB TAB Ectopic Multiple Live Births                  # Outcome Date GA Lbr Tyron/2nd Weight Sex Delivery Anes PTL Lv   1 Current                Physical exam:  /60   Wt 165 lb 4 oz (75 kg)   LMP 02/10/2019 (Approximate)   BMI 28.37 kg/m      General: alert, female in no acute distress  Abdomen: gravid uterus at 35 cm above pubic symphysis, non-tender, FHTs 145  Extremities: no edema    Assessment/Plan:  1. Encounter for supervision of normal first pregnancy in third trimester  G1 at 36 wks today. Risks during pregnancy include: mental health issues on SSRI therapy, history of opioid and alcohol abuse now in remission, e-cigarette use during pregnancy.  Genetic testing was normal, expecting baby girl, maternal negative CF carrier. Tdap and flu shot administered during pregnancy. MFM referral placed for evaluation of low weight gain and small growth on our US - normal interval growth, no IUGR, normal US evaluation. GBS swab done today.    2. Moderate episode of recurrent major depressive disorder (H)  3. Bipolar 2 disorder, major depressive episode (H)  4. Chronic post-traumatic stress disorder (PTSD)  Patient reports stable/manageable mood symptoms at this time, will continue Lexapro 30 mg daily. PHQ9 score 3 today.    5. History of alcohol abuse  6. Hx of opioid abuse (H)  Continued remission from these substances reported. Continued cessation from  nicotine use.      Plans for contraception (pt deciding between nexplanon and mirena IUD), breastfeeding reviewed. Reviewed delayed cord clamping, rooming in, skin to skin.  Discussed preferences during labor.     Follow up in 1 week for routine prenatal care.    Yoon Villafuerte MD

## 2021-06-02 NOTE — TELEPHONE ENCOUNTER
Last seen 1/14/19 and was to recheck in 1 month.  Currently pregnant.    RN cannot approve Refill Request    RN can NOT refill this medication med is not covered by policy/route to provider. Last office visit: 3/25/2019 Norma Green CNP Last Physical: Visit date not found Last MTM visit: Visit date not found Last visit same specialty: 4/23/2019 Malu Yuan MD.  Next visit within 3 mo: Visit date not found  Next physical within 3 mo: Visit date not found      Jennifer Sanchez, [facility] Connection Triage/Med Refill 10/28/2019    Requested Prescriptions   Pending Prescriptions Disp Refills     escitalopram oxalate (LEXAPRO) 10 MG tablet [Pharmacy Med Name: Escitalopram Oxalate Oral Tablet 10 MG] 90 tablet 0     Sig: Take 3 tablets (30 mg total) by mouth daily.       SSRI Refill Protocol  Passed - 10/28/2019  7:01 AM        Passed - PCP or prescribing provider visit in last year     Last office visit with prescriber/PCP: 3/25/2019 Norma Green CNP OR same dept: 4/23/2019 Malu Yuan MD OR same specialty: 4/23/2019 Malu Yuan MD  Last physical: Visit date not found Last MTM visit: Visit date not found   Next visit within 3 mo: Visit date not found  Next physical within 3 mo: Visit date not found  Prescriber OR PCP: Norma Green CNP  Last diagnosis associated with med order: 1. Moderate episode of recurrent major depressive disorder (H)  - escitalopram oxalate (LEXAPRO) 10 MG tablet [Pharmacy Med Name: Escitalopram Oxalate Oral Tablet 10 MG]; Take 3 tablets (30 mg total) by mouth daily.  Dispense: 90 tablet; Refill: 0    2. Bipolar 2 disorder, major depressive episode (H)  - escitalopram oxalate (LEXAPRO) 10 MG tablet [Pharmacy Med Name: Escitalopram Oxalate Oral Tablet 10 MG]; Take 3 tablets (30 mg total) by mouth daily.  Dispense: 90 tablet; Refill: 0    3. Chronic post-traumatic stress disorder (PTSD)  - escitalopram oxalate (LEXAPRO) 10 MG tablet [Pharmacy Med Name: Escitalopram  Oxalate Oral Tablet 10 MG]; Take 3 tablets (30 mg total) by mouth daily.  Dispense: 90 tablet; Refill: 0    If protocol passes may refill for 12 months if within 3 months of last provider visit (or a total of 15 months).

## 2021-06-02 NOTE — PROGRESS NOTES
"Please see \"Imaging\" tab under Chart Review for full report.  This ultrasound was performed in the [facility], and may be located under Care Everywhere.    Ave Richards MD  Maternal Fetal Medicine    "

## 2021-06-02 NOTE — PROGRESS NOTES
Discussed case with [facility] via telephone today about recent US findings from 10/8/19.    [facility] recommendations:  -not concerned with placental findings  -Concern with umbilical artery Doppler at current gestational age  -Would not term IUGR unless AC less than 5th percentile    Will plan for referral to [facility] for further evaluation with ultrasound.    Dr Villafuerte

## 2021-06-02 NOTE — TELEPHONE ENCOUNTER
Patient Returning Call  Reason for call:  Patient returning call  Information relayed to patient:  - Message from Yoon Villafuerte MD sent at 10/9/2019  9:34 AM CDT -----  Please call pt with test result.     Hi Jerome,     Your ultrasound results have returned. Baby is measuring small, less than the 10th percentile - this is concerning for potential growth restriction. I discussed this with the perinatologist specialist and we both agree that it would be beneficial to have a more detailed ultrasound at their office. You will get a phone call to schedule this. If baby does have growth restriction then we do more monitoring for the rest of your pregnancy and often deliver baby early.     Thanks  Dr Villafuerte  Patient has additional questions:  No  If YES, what are your questions/concerns:  Patient will schedule appointment for ultrasound .  Okay to leave a detailed message?: No

## 2021-06-02 NOTE — PATIENT INSTRUCTIONS - HE
Phone Numbers:  [facility]: [phone]  [facility]: [phone]    Cold treatments:  Ok to use mucinex, flonase nasal spray or other nasal saline wash/spray, cough drops or robitussin, tylenol     When to call or come in to the hospital  If you notice a decrease in your baby's movement.   If your begin to experience contractions that are 5 minutes or less apart and lasting for over 45 seconds, or if contractions are becoming more painful.  If you have any bleeding or leakage of fluids.   If you have a headache not resolved with tylenol, right upper abdominal pain, or sudden onset of swelling.  You know your body best. Never hesitate to call or go to the hospital if something doesn't feel right!    Preparing for the hospital  You re likely feeling anxious as your child s birth approaches. This is normal. To give yourself some peace of mind, pack a bag 3-4 weeks before your due date. Here is a list of things to remember:  Personal care items like toothbrush, hair brush, lip balm, lotion, shampoo, glasses, contacts  Nightgown, bathrobe, slippers  Several pairs of underwear  Comfortable clothes for you to wear home  Camera with new batteries  Cell phone and   Insurance information and any other paperwork needed for your hospital stay  Clothes for baby to wear home  An infant, rear-facing car seat for bringing home your baby (this is required by law)

## 2021-06-03 VITALS
HEIGHT: 64 IN | TEMPERATURE: 98.1 F | SYSTOLIC BLOOD PRESSURE: 100 MMHG | BODY MASS INDEX: 27.49 KG/M2 | HEART RATE: 77 BPM | WEIGHT: 161 LBS | OXYGEN SATURATION: 98 % | DIASTOLIC BLOOD PRESSURE: 62 MMHG | RESPIRATION RATE: 16 BRPM

## 2021-06-03 VITALS
DIASTOLIC BLOOD PRESSURE: 60 MMHG | SYSTOLIC BLOOD PRESSURE: 110 MMHG | WEIGHT: 165.06 LBS | BODY MASS INDEX: 28.33 KG/M2

## 2021-06-03 VITALS
BODY MASS INDEX: 28.38 KG/M2 | SYSTOLIC BLOOD PRESSURE: 100 MMHG | DIASTOLIC BLOOD PRESSURE: 60 MMHG | WEIGHT: 165.31 LBS

## 2021-06-03 VITALS — WEIGHT: 152.5 LBS | BODY MASS INDEX: 27.01 KG/M2

## 2021-06-03 VITALS — BODY MASS INDEX: 26.73 KG/M2 | WEIGHT: 150.9 LBS

## 2021-06-03 VITALS
WEIGHT: 165.25 LBS | SYSTOLIC BLOOD PRESSURE: 100 MMHG | BODY MASS INDEX: 28.37 KG/M2 | DIASTOLIC BLOOD PRESSURE: 60 MMHG

## 2021-06-03 VITALS — BODY MASS INDEX: 26.57 KG/M2 | SYSTOLIC BLOOD PRESSURE: 100 MMHG | WEIGHT: 150 LBS | DIASTOLIC BLOOD PRESSURE: 60 MMHG

## 2021-06-03 VITALS — WEIGHT: 151.06 LBS | BODY MASS INDEX: 26.76 KG/M2

## 2021-06-03 VITALS — BODY MASS INDEX: 26.29 KG/M2 | WEIGHT: 148.44 LBS

## 2021-06-03 VITALS
DIASTOLIC BLOOD PRESSURE: 60 MMHG | WEIGHT: 158.56 LBS | BODY MASS INDEX: 28.09 KG/M2 | SYSTOLIC BLOOD PRESSURE: 100 MMHG

## 2021-06-03 VITALS — BODY MASS INDEX: 26.36 KG/M2 | WEIGHT: 148.8 LBS

## 2021-06-03 VITALS — BODY MASS INDEX: 26.57 KG/M2 | WEIGHT: 150 LBS

## 2021-06-03 NOTE — ANESTHESIA POSTPROCEDURE EVALUATION
Patient: Jerome Avila  * No procedures listed *  Anesthesia type: epidural    Patient location: Labor and Delivery  Last vitals: No vitals data found for the desired time range.    Post vital signs: stable  Level of consciousness: awake and responds to simple questions  Post-anesthesia pain: pain controlled  Post-anesthesia nausea and vomiting: no  Pulmonary: unassisted, return to baseline  Cardiovascular: stable and blood pressure at baseline  Hydration: adequate  Anesthetic events: no    QCDR Measures:  ASA# 11 - Aleah-op Cardiac Arrest: ASA11B - Patient did NOT experience unanticipated cardiac arrest  ASA# 12 - Aleah-op Mortality Rate: ASA12B - Patient did NOT die  ASA# 13 - PACU Re-Intubation Rate: ASA13B - Patient did NOT require a new airway mgmt  ASA# 10 - Composite Anes Safety: ASA10A - No serious adverse event    Additional Notes:  Patient is ambulating appropriately without headache.

## 2021-06-03 NOTE — PROGRESS NOTES
"Please see the \"Imaging\" tab for details of today's ultrasound.    Radha Ko MD  Specialist in Maternal-Fetal Medicine    "

## 2021-06-03 NOTE — TELEPHONE ENCOUNTER
I spoke with Martha galeano at [facility].  I did agree the patient needs to be emergently admitted but I also feel that patient can run home to pack a bag provided she is at labor and delivery within 2 hours.  Maternal-fetal medicine agreed that this seemed reasonable.  I alerted labor and delivery of her incoming arrival and left a message with Dr. Villafuerte.

## 2021-06-03 NOTE — ANESTHESIA PREPROCEDURE EVALUATION
Anesthesia Evaluation      Patient summary reviewed   No history of anesthetic complications     Airway   Mallampati: II   Pulmonary - negative ROS and normal exam                          Cardiovascular - negative ROS and normal exam  Exercise tolerance: > or = 4 METS  Rhythm: regular  Rate: normal,         Neuro/Psych    (+) depression, anxiety/panic attacks,     Comments: BPAD  Suicidal ideation  substance abuse    Endo/Other    (+) pregnant     GI/Hepatic/Renal - negative ROS           Dental - normal exam                        Anesthesia Plan  Planned anesthetic: epidural    ASA 3     Anesthetic plan and risks discussed with: patient and spouse    Post-op plan: routine recovery

## 2021-06-03 NOTE — ANESTHESIA PROCEDURE NOTES
Epidural Block    Patient location during procedure: OB  Time Called: 11/5/2019 4:50 AM  Reason for Block:labor epidural  Staffing:  Performing  Anesthesiologist: Armin Barclay MD  Preanesthetic Checklist  Completed: patient identified, risks, benefits, and alternatives discussed, timeout performed, consent obtained, at patient's request, airway assessed, oxygen available, suction available, emergency drugs available and hand hygiene performed  Procedure  Patient position: sitting  Prep: ChloraPrep  Patient monitoring: continuous pulse oximetry  Approach: midline  Location: L3-L4  Injection technique: ALFRED saline  Number of Attempts:1  Needle  Needle type: Sheridan   Needle gauge: 18 G     Catheter in Space: 5  Assessment  Sensory level: T8  No complications      Additional Notes:  Called to patient's room for epidural  Consent obtained  Timeput performed  RN at bedside during procedure

## 2021-06-03 NOTE — TELEPHONE ENCOUNTER
"[facility]   [phone]         Call from Fetal / Maternal group - following pt         Message to Dr Villafuerte       \"Fetal growth restricted\"    \"Low fluid\"     They would rec that she head to hospital to be delivered  - pt still in office        Requesting Dr Villafuerte call back to discuss dispo        Slava Yen, RN   Triage and Medication Refills    "

## 2021-06-03 NOTE — PROGRESS NOTES
RN spoke with Dr Grande from HE clinic. Updated that Dr Villafuerte's patient, Jerome, was seen in our clinic today and our provider (Dr Ko) is recommending delivery d/t FGR and oligo.     Jerome instructed to head over to [facility] (no longer than 2 hours from now) for induction. Dr Grande to notify [facility] of pt's arrival.     Pt updated and agreeable with plan.

## 2021-06-04 NOTE — TELEPHONE ENCOUNTER
RN cannot approve Refill Request    RN can NOT refill this medication Warning regarding dosage. PCP to review. Last office visit: 3/25/2019 Norma Green CNP Last Physical: Visit date not found Last MTM visit: Visit date not found Last visit same specialty: 4/23/2019 Malu Yuan MD.  Next visit within 3 mo: Visit date not found  Next physical within 3 mo: Visit date not found      [facility] Connection Triage/Med Refill 12/13/2019    Requested Prescriptions   Pending Prescriptions Disp Refills     escitalopram oxalate (LEXAPRO) 10 MG tablet 90 tablet 0     Sig: Take 3 tablets (30 mg total) by mouth daily.       SSRI Refill Protocol  Passed - 12/11/2019  3:16 PM        Passed - PCP or prescribing provider visit in last year     Last office visit with prescriber/PCP: 3/25/2019 Norma Green CNP OR same dept: 4/23/2019 Malu Yuan MD OR same specialty: 4/23/2019 Malu Yuan MD  Last physical: Visit date not found Last MTM visit: Visit date not found   Next visit within 3 mo: Visit date not found  Next physical within 3 mo: Visit date not found  Prescriber OR PCP: Norma Green CNP  Last diagnosis associated with med order: 1. Moderate episode of recurrent major depressive disorder (H)  - escitalopram oxalate (LEXAPRO) 10 MG tablet; Take 3 tablets (30 mg total) by mouth daily.  Dispense: 90 tablet; Refill: 0    2. Bipolar 2 disorder, major depressive episode (H)  - escitalopram oxalate (LEXAPRO) 10 MG tablet; Take 3 tablets (30 mg total) by mouth daily.  Dispense: 90 tablet; Refill: 0    3. Chronic post-traumatic stress disorder (PTSD)  - escitalopram oxalate (LEXAPRO) 10 MG tablet; Take 3 tablets (30 mg total) by mouth daily.  Dispense: 90 tablet; Refill: 0    If protocol passes may refill for 12 months if within 3 months of last provider visit (or a total of 15 months).

## 2021-06-05 VITALS
DIASTOLIC BLOOD PRESSURE: 70 MMHG | OXYGEN SATURATION: 99 % | BODY MASS INDEX: 20.2 KG/M2 | HEART RATE: 104 BPM | SYSTOLIC BLOOD PRESSURE: 112 MMHG | WEIGHT: 121.4 LBS

## 2021-06-05 VITALS
BODY MASS INDEX: 20.18 KG/M2 | HEART RATE: 73 BPM | DIASTOLIC BLOOD PRESSURE: 70 MMHG | SYSTOLIC BLOOD PRESSURE: 120 MMHG | WEIGHT: 121.25 LBS

## 2021-06-05 NOTE — TELEPHONE ENCOUNTER
Orders being requested:   Breast Pump- Medela Symphony   Model# [id number]    Reason service is needed/diagnosis: Nursing    When are orders needed by:   ASAP    Where to send Orders:   [facility] medical equipment  Ph:    Fx:     Okay to leave detailed message?    Yes    Please call patient when orders are fax to DME

## 2021-06-05 NOTE — TELEPHONE ENCOUNTER
Refill Request  Did you contact pharmacy: No  Medication name:   Requested Prescriptions     Pending Prescriptions Disp Refills     escitalopram oxalate (LEXAPRO) 10 MG tablet 90 tablet 0     Sig: Take 3 tablets (30 mg total) by mouth daily.     Who prescribed the medication:   IRINA OSPINA  Requested Pharmacy: [id number]  Is patient out of medication: No.  5 days left  Patient notified refills processed in 3 business days:  yes  Okay to leave a detailed message: yes

## 2021-06-05 NOTE — TELEPHONE ENCOUNTER
Refill Approved    Rx renewed per Medication Renewal Policy. Medication was last renewed on 12/17/19.    Teresa Holder, Care Connection Triage/Med Refill 1/17/2020     Requested Prescriptions   Pending Prescriptions Disp Refills     escitalopram oxalate (LEXAPRO) 10 MG tablet 90 tablet 0     Sig: Take 3 tablets (30 mg total) by mouth daily.       SSRI Refill Protocol  Passed - 1/15/2020  4:07 PM        Passed - PCP or prescribing provider visit in last year     Last office visit with prescriber/PCP: 3/25/2019 Norma Green CNP OR same dept: 4/23/2019 Malu Yuan MD OR same specialty: 4/23/2019 Malu Yuan MD  Last physical: Visit date not found Last MTM visit: Visit date not found   Next visit within 3 mo: Visit date not found  Next physical within 3 mo: Visit date not found  Prescriber OR PCP: Norma Green CNP  Last diagnosis associated with med order: 1. Moderate episode of recurrent major depressive disorder (H)  - escitalopram oxalate (LEXAPRO) 10 MG tablet; Take 3 tablets (30 mg total) by mouth daily.  Dispense: 90 tablet; Refill: 0    2. Bipolar 2 disorder, major depressive episode (H)  - escitalopram oxalate (LEXAPRO) 10 MG tablet; Take 3 tablets (30 mg total) by mouth daily.  Dispense: 90 tablet; Refill: 0    3. Chronic post-traumatic stress disorder (PTSD)  - escitalopram oxalate (LEXAPRO) 10 MG tablet; Take 3 tablets (30 mg total) by mouth daily.  Dispense: 90 tablet; Refill: 0    If protocol passes may refill for 12 months if within 3 months of last provider visit (or a total of 15 months).

## 2021-06-08 NOTE — TELEPHONE ENCOUNTER
RN cannot approve Refill Request    RN can NOT refill this medication Protocol failed and NO refill given.       Teresa Holder, Care Connection Triage/Med Refill 6/9/2020    Requested Prescriptions   Pending Prescriptions Disp Refills     escitalopram oxalate (LEXAPRO) 10 MG tablet 270 tablet 3     Sig: Take 3 tablets (30 mg total) by mouth daily.       SSRI Refill Protocol  Failed - 6/5/2020  3:20 PM        Failed - PCP or prescribing provider visit in last year     Last office visit with prescriber/PCP: 3/25/2019 Norma Green CNP OR same dept: Visit date not found OR same specialty: 4/23/2019 Malu Yuan MD  Last physical: Visit date not found Last MTM visit: Visit date not found   Next visit within 3 mo: Visit date not found  Next physical within 3 mo: Visit date not found  Prescriber OR PCP: Norma Green CNP  Last diagnosis associated with med order: 1. Moderate episode of recurrent major depressive disorder (H)  - escitalopram oxalate (LEXAPRO) 10 MG tablet; Take 3 tablets (30 mg total) by mouth daily.  Dispense: 270 tablet; Refill: 0    2. Bipolar 2 disorder, major depressive episode (H)  - escitalopram oxalate (LEXAPRO) 10 MG tablet; Take 3 tablets (30 mg total) by mouth daily.  Dispense: 270 tablet; Refill: 0    3. Chronic post-traumatic stress disorder (PTSD)  - escitalopram oxalate (LEXAPRO) 10 MG tablet; Take 3 tablets (30 mg total) by mouth daily.  Dispense: 270 tablet; Refill: 0    If protocol passes may refill for 12 months if within 3 months of last provider visit (or a total of 15 months).

## 2021-06-08 NOTE — PROGRESS NOTES
"Jerome Avila is a 25 y.o. female who is being evaluated via a billable video visit.      The patient has been notified of following:     \"This video visit will be conducted via a call between you and your physician/provider. We have found that certain health care needs can be provided without the need for an in-person physical exam.  This service lets us provide the care you need with a video conversation.  If a prescription is necessary we can send it directly to your pharmacy.  If lab work is needed we can place an order for that and you can then stop by our lab to have the test done at a later time.    Video visits are billed at different rates depending on your insurance coverage. Please reach out to your insurance provider with any questions.    If during the course of the call the physician/provider feels a video visit is not appropriate, you will not be charged for this service.\"    Patient has given verbal consent to a Video visit? Yes    Patient would like to receive their AVS by AVS Preference: Mail a copy.    Patient would like the video invitation sent by: Text to cell phone: [phone]    Will anyone else be joining your video visit? No        Video Start Time: 4:34 pm    Additional provider notes:  Assessment/Plan:    1. Moderate episode of recurrent major depressive disorder (H)  2. Chronic post-traumatic stress disorder (PTSD)  Pt reports lexapro is working well for her, requests refill. PHQ9 score mildly elevated at 9 today, pt thinks related to life/world stress. No side effects reported. Refill provided today. If PHQ9 score remains mildly elevated could consider adding therapy vs an additional medication.  - PHQ9 Depression Screen  - escitalopram oxalate (LEXAPRO) 10 MG tablet; Take 3 tablets (30 mg total) by mouth daily.  Dispense: 270 tablet; Refill: 1    3. Opioid dependence, in remission (H)  Pt reports no use.    4. History of alcohol use disorder  Pt reports having a few drinks over the " holidays but otherwise no use.      Follow up: 6 months, sooner as needed    Yoon Villafuerte MD  [facility]    Subjective:    Patient ID: Jerome Avila is a 25 y.o. female being seen today via video visit for mood/med ck    Mood/med ck  -pt here for mood/med check  -she reports feeling like things are overall going well at this time - feels like the medication is still working for her  -taking lexapro 30mg daily  -reports no side effects or concerns  -PHQ9 score 9 today, no thoughts of self harm  -she reports needing refill of lexapro  -she reports no opioid use  -she reports having a few drinks over the holidays but otherwise not drinking alcohol, states the lexapro helps so she doesn't feel like she needs to self medicate with alcohol      Patient Active Problem List   Diagnosis     Bipolar 2 disorder, major depressive episode (H)     Chronic post-traumatic stress disorder (PTSD)     Severe opioid dependence in sustained remission (H)     History of alcohol use disorder     Past Medical History:   Diagnosis Date     Alcohol abuse      Bipolar 2 disorder (H)      Deliberate self-cutting      Depression      Drug abuse, opioid type (H)      PTSD (post-traumatic stress disorder)      Suicidal ideation      Past Surgical History:   Procedure Laterality Date     DIAGNOSTIC LAPAROSCOPY      pelvic, looking for etiology of pain     Current Outpatient Medications on File Prior to Visit   Medication Sig Dispense Refill     ferrous sulfate 325 (65 FE) MG tablet Take 1 tablet by mouth daily with breakfast.       magnesium 200 mg Tab Take by mouth.       melatonin 1 mg Tab tablet Take 3 mg by mouth bedtime as needed.       multivitamin (MULTIPLE VITAMIN ORAL) Take by mouth.       [DISCONTINUED] escitalopram oxalate (LEXAPRO) 10 MG tablet Take 3 tablets (30 mg total) by mouth daily. 270 tablet 0     [DISCONTINUED] prenatal vitamin iron-folic acid 27mg-0.8mg (PRENATAL S) 27 mg iron- 800 mcg Tab tablet Take 1  tablet by mouth daily.       No current facility-administered medications on file prior to visit.      No Known Allergies  Social History     Socioeconomic History     Marital status: Single     Spouse name: Not on file     Number of children: Not on file     Years of education: Not on file     Highest education level: Not on file   Occupational History     Not on file   Social Needs     Financial resource strain: Not on file     Food insecurity     Worry: Not on file     Inability: Not on file     Transportation needs     Medical: Not on file     Non-medical: Not on file   Tobacco Use     Smoking status: Former Smoker     Packs/day: 0.00     Last attempt to quit: 2014     Years since quittin.4     Smokeless tobacco: Former User     Quit date: 2019     Tobacco comment: e-cig smoker   Substance and Sexual Activity     Alcohol use: Not Currently     Alcohol/week: 2.0 standard drinks     Types: 2 Cans of beer per week     Comment: She reports binge drinking on days off (two days out of the week) and drinks beers occasionally before work     Drug use: No     Comment: Hasn't used illicit drugs in 3 years     Sexual activity: Yes     Partners: Male     Comment: engaged   Lifestyle     Physical activity     Days per week: Not on file     Minutes per session: Not on file     Stress: Not on file   Relationships     Social connections     Talks on phone: Not on file     Gets together: Not on file     Attends [employer] service: Not on file     Active member of club or organization: Not on file     Attends meetings of clubs or organizations: Not on file     Relationship status: Not on file     Intimate partner violence     Fear of current or ex partner: Not on file     Emotionally abused: Not on file     Physically abused: Not on file     Forced sexual activity: Not on file   Other Topics Concern     Not on file   Social History Narrative     Not on file     Family History   Problem Relation Age of Onset     Bipolar  disorder Mother      Suicidality Father      Depression Sister      Anxiety disorder Sister      COPD Maternal Grandmother      Heart attack Maternal Grandfather      No Medical Problems Paternal Grandmother      No Medical Problems Paternal Grandfather      Depression Sister      Anxiety disorder Sister      Cystic fibrosis Cousin      Cystic fibrosis Cousin      Review of systems is as stated in HPI, and the remainder of system review is otherwise negative.    Objective:      There were no vitals taken for this visit.    General appearance: awake, NAD, baby also seen on video visit today  HEENT: atraumatic, normocephalic  Lungs: breathing comfortably on room air, no cough  Neuro: alert, CNs grossly intact, no focal deficits appreciated  Psych: normal mood/affect/behavior, answering questions appropriately, linear thought process      Video-Visit Details    Type of service:  Video Visit    Video End Time (time video stopped): 4:42pm  Originating Location (pt. Location): Home    Distant Location (provider location):  [facility] FAMILY MEDICINE/OB     Platform used for Video Visit: Eamon Villafuerte MD

## 2021-06-08 NOTE — TELEPHONE ENCOUNTER
Who is calling:  Jerome  Reason for Call:  Patient has an appointment set up with you tomorrow but was hoping to get refill  Date of last appointment with primary care:NA  Okay to leave a detailed message: Yes

## 2021-06-08 NOTE — TELEPHONE ENCOUNTER
Left message to call back for: pt/Jerome  Information to relay to patient:  LDM for Jerome to call bk or reach out to us via [employer] to schedule a VV w/Norma Green CNP for med check- prior to RX being filled

## 2021-06-12 NOTE — TELEPHONE ENCOUNTER
RN cannot approve Refill Request    RN can NOT refill this medication Protocol failed and NO refill given. Last office visit: 3/25/2019 Norma Green CNP Last Physical: Visit date not found Last MTM visit: Visit date not found Last visit same specialty: 4/23/2019 Malu Yuan MD.  Next visit within 3 mo: Visit date not found  Next physical within 3 mo: Visit date not found      Teresa Holder, Care Connection Triage/Med Refill 10/13/2020    Requested Prescriptions   Pending Prescriptions Disp Refills     escitalopram oxalate (LEXAPRO) 10 MG tablet 270 tablet 1     Sig: Take 3 tablets (30 mg total) by mouth daily.       SSRI Refill Protocol  Failed - 10/12/2020  3:01 PM        Failed - PCP or prescribing provider visit in last year     Last office visit with prescriber/PCP: 3/25/2019 Norma Green CNP OR same dept: Visit date not found OR same specialty: 4/23/2019 Malu Yuan MD  Last physical: Visit date not found Last MTM visit: Visit date not found   Next visit within 3 mo: Visit date not found  Next physical within 3 mo: Visit date not found  Prescriber OR PCP: Norma Green CNP  Last diagnosis associated with med order: 1. Moderate episode of recurrent major depressive disorder (H)  - escitalopram oxalate (LEXAPRO) 10 MG tablet; Take 3 tablets (30 mg total) by mouth daily.  Dispense: 270 tablet; Refill: 1    2. Chronic post-traumatic stress disorder (PTSD)  - escitalopram oxalate (LEXAPRO) 10 MG tablet; Take 3 tablets (30 mg total) by mouth daily.  Dispense: 270 tablet; Refill: 1    If protocol passes may refill for 12 months if within 3 months of last provider visit (or a total of 15 months).

## 2021-06-13 NOTE — PROGRESS NOTES
Assessment and Plan:     1. Moderate episode of recurrent major depressive disorder  escitalopram oxalate (LEXAPRO) 20 MG tablet   2. Birth control counseling       Obtained PHQ 9 score of 14.  She would like to increase the dose of escitalopram.  Will increase dose to 20 mg daily.  Educated on its indications and side effects including increased risk of suicide.  She feels as though she has a good support system.  She declines counseling.  She does not currently have health insurance.  She will notify me by telephone in 1 month of how she is doing.  If she is not doing well, I encouraged close follow-up.  As for the oral contraceptive, I recommend she speak to her pharmacist of the cheapest option.  I will then prescribe this for her.  We discussed the increase risk in blood clots. She is aware that she is due for a physical with pap. She declines STD screening today.  She is content with the plan.    Subjective:     Jerome is a 22 y.o. female presenting to the clinic for medication management.  Patient has major depression which was diagnosed in childhood after her father committed suicide.  She has been taking escitalopram 10 mg daily.  She tolerates the medication well and denies any side effects.  Patient works as an  at [employer].  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  She is engaged to be  and lives with her fiancé.  She states they have a good relationship.  She feels as though she has a good support system.  She denies thoughts of suicide.  Patient is also interested in starting an oral contraceptive.  She currently does not have insurance.  She denies any personal or family history of blood clots.  Her last menstrual period was last week.  Her periods are regular occuring once per month.  She has no concerns for STDs.    Review of Systems: A complete 14 point review of systems was obtained and is negative or as stated in the history of  "present illness.    Social History     Social History     Marital status: Single     Spouse name: N/A     Number of children: N/A     Years of education: N/A     Occupational History     Not on file.     Social History Main Topics     Smoking status: Former Smoker     Quit date: 1/1/2014     Smokeless tobacco: Current User      Comment: e-cig smoker     Alcohol use 1.2 oz/week     2 Cans of beer per week     Drug use: No     Sexual activity: Yes     Partners: Male      Comment: engaged     Other Topics Concern     Not on file     Social History Narrative     No narrative on file       Active Ambulatory Problems     Diagnosis Date Noted     No Active Ambulatory Problems     Resolved Ambulatory Problems     Diagnosis Date Noted     No Resolved Ambulatory Problems     Past Medical History:   Diagnosis Date     Depression        Family History   Problem Relation Age of Onset     Suicidality Father        Objective:     /62 (Patient Site: Right Arm, Patient Position: Sitting, Cuff Size: Adult Regular)  Pulse 66  Ht 5' 4\" (1.626 m)  Wt 142 lb 1.6 oz (64.5 kg)  SpO2 96%  BMI 24.39 kg/m2    Patient is alert, in no obvious distress.   Skin: Warm, dry.    Lungs:  Clear to auscultation. Respirations even and unlabored.  No wheezing or rales noted.   Heart:  Regular rate and rhythm.  No murmurs.  Abdomen: Soft, nontender.  No organomegaly. Bowel sounds normoactive. No guarding or masses noted.                 "

## 2021-06-15 NOTE — TELEPHONE ENCOUNTER
"Pt calls re \"anxiety, continuing.\"  Was newly prescribed buspirone 2/27/21 from ED.  Also takes Lexapro daily.    When inquiring whether anxiety is better with buspirone, pt becomes tearful.  States \"No -> Hard to tell.\"    Pt would like clinic f/u to discuss med options and next steps.  Denies any severe symptoms at the moment.  Has a dog she can take for walks.  States \"Okay to wait 'til tomorrow for clinic appt.\"  Therefore now transferring to a  for an appointment now.    No suspicion of covid symptoms per screening.  Upon reaching , pt hangs up phone.  Therefore attempted to call back to pt; made two attempts; reached voicemail only.  Left message on 2nd attempt, encouraging pt to call back and seek appt for ED f/u and anxiety med questions.  Also provided contact # for callback to nurse triage line.  Will await pt's callback ....    Eileen Amador RN  [facility] Triage     Reason for Disposition    Started on anti-anxiety medication and no relief    Additional Information    Negative: SEVERE difficulty breathing (e.g., struggling for each breath, speaks in single words)    Negative: Bluish (or gray) lips or face    Negative: Difficult to awaken or acting confused  (e.g., disoriented, slurred speech)    Negative: Hysterical or combative behavior    Negative: Sounds like a life-threatening emergency to the triager    Negative: Chest pain    Negative: Palpitations, skipped heart beat, or rapid heart beat    Negative: Cough is main symptom    Negative: Suicide thoughts, threats, attempts, or questions    Negative: Depression is main problem or symptom (e.g., feelings of sadness or hopelessness)    Negative: Difficulty breathing and persists > 10 minutes and not relieved by reassurance provided by triager    Negative: Lightheadedness or dizziness and persists > 10 minutes and not relieved by reassurance provided by triager    Negative: Alcohol or drug abuse, known or suspected, and feeling very " shaky (i.e., visible tremors of hands)    Negative: Patient sounds very sick or weak to the triager    Negative: Patient sounds very upset or troubled to the triager    Negative: Symptoms interfere with work or school    Negative: Symptoms of anxiety or panic and has not been evaluated for this by physician    Protocols used: ANXIETY AND PANIC ATTACK-A-[place]

## 2021-06-15 NOTE — TELEPHONE ENCOUNTER
Patient was discharged from hospital on 2/27/21 and prescribed buspirone.  Patient states she is very tired and is having difficulty sleeping; asking if that could be a side effect of buspirone.  Patient will try to sleep and will call back to make follow up appointment with PCP.  Patient asking if there are any interactions between buspirone and melatonin; will speak with pharmacist.    Ellie Hancock RN  [facility] Triage Nurse Advisor

## 2021-06-16 PROBLEM — F43.12 CHRONIC POST-TRAUMATIC STRESS DISORDER (PTSD): Chronic | Status: ACTIVE | Noted: 2018-10-25

## 2021-06-16 PROBLEM — F31.81 BIPOLAR 2 DISORDER, MAJOR DEPRESSIVE EPISODE (H): Chronic | Status: ACTIVE | Noted: 2018-10-25

## 2021-06-16 PROBLEM — Z87.898 HISTORY OF ALCOHOL USE DISORDER: Status: ACTIVE | Noted: 2020-06-10

## 2021-06-16 PROBLEM — F11.21: Chronic | Status: ACTIVE | Noted: 2018-10-25

## 2021-06-16 NOTE — PROGRESS NOTES
Assessment and Plan:     1. Bipolar 2 disorder, major depressive episode (H)     2. Chronic post-traumatic stress disorder (PTSD)     3. Drug overdose, accidental or unintentional, initial encounter     4. History of alcohol use disorder     5. Moderate episode of recurrent major depressive disorder (H)     6. Anxiety     7. Birth control counseling       Obtained PHQ-9 score of 7 and HANG score of 5.  She feels as though her mood is stable.  She denies thoughts of suicide.  She continues Escitalopram 20 mg daily and BuSpar 5 mg 3 times daily.  She has a psychiatry appointment in May.  I encouraged her to keep this appointment.  I congratulated her on her alcohol cessation.  Discussed treatment options for birth control.  She is interested in the Implanon or IUD.  I will have her schedule an appointment with a colleague for a consult related to this.  Discussed safe sex practices and importance of using condoms in the meantime.  She is content with the plan.    20 minutes spent on the date of the encounter doing chart review, history and exam, documentation and further activities per the note      Subjective:     Jerome is a 25 y.o. female presenting to the clinic for medication management.  Patient has a history of bipolar 2 disorder and generalized anxiety.  She was hospitalized in February 2021 for overdose related to Phenibut.  On discharge, she started seeing a therapist and a substance abuse counselor.  Patient is currently taking Escitalopram 20 mg daily and BuSpar 5 mg 3 times daily.  She feels as though the BuSpar helps significantly with her anxiety.  She has not had a panic attack.  She does not like confrontation, so her stress tends to build up.  She has tried breathing techniques.  She feels well supported at home.  She lives with her fiancé and her 1-1/2-year-old daughter.  She works at [employer] in [place].  She denies thoughts of suicide.  Patient has a history of alcohol abuse and has been in  remission since October.  She denies any drug use.  Patient is interested in birth control options.  She denies any personal or family history of blood clots.  She does not smoke.    Review of Systems: A complete 14 point review of systems was obtained and is negative or as stated in the history of present illness.    Social History     Socioeconomic History     Marital status: Single     Spouse name: Not on file     Number of children: Not on file     Years of education: Not on file     Highest education level: Not on file   Occupational History     Not on file   Social Needs     Financial resource strain: Not on file     Food insecurity     Worry: Not on file     Inability: Not on file     Transportation needs     Medical: Not on file     Non-medical: Not on file   Tobacco Use     Smoking status: Former Smoker     Packs/day: 0.00     Quit date: 2014     Years since quittin.3     Smokeless tobacco: Former User     Quit date: 2019     Tobacco comment: e-cig smoker   Substance and Sexual Activity     Alcohol use: Not Currently     Alcohol/week: 2.0 standard drinks     Types: 2 Cans of beer per week     Comment: She reports binge drinking on days off (two days out of the week) and drinks beers occasionally before work     Drug use: No     Comment: Hasn't used illicit drugs in 3 years     Sexual activity: Yes     Partners: Male     Comment: engaged   Lifestyle     Physical activity     Days per week: Not on file     Minutes per session: Not on file     Stress: Not on file   Relationships     Social connections     Talks on phone: Not on file     Gets together: Not on file     Attends [employer] service: Not on file     Active member of club or organization: Not on file     Attends meetings of clubs or organizations: Not on file     Relationship status: Not on file     Intimate partner violence     Fear of current or ex partner: Not on file     Emotionally abused: Not on file     Physically abused: Not on  file     Forced sexual activity: Not on file   Other Topics Concern     Not on file   Social History Narrative     Not on file       Active Ambulatory Problems     Diagnosis Date Noted     Bipolar 2 disorder, major depressive episode (H) 10/25/2018     Chronic post-traumatic stress disorder (PTSD) 10/25/2018     Severe opioid dependence in sustained remission (H) 10/25/2018     History of alcohol use disorder 06/10/2020     Drug overdose 2021     Drug overdose, accidental or unintentional, initial encounter 2021     Altered mental status 2021     Acute respiratory failure with hypoxemia (H)      Toxic encephalopathy      Acute reaction to stress      Moderate episode of recurrent major depressive disorder (H)      Anxiety      Resolved Ambulatory Problems     Diagnosis Date Noted     Severe alcohol use disorder (H) 10/25/2018     Pregnant 2019      (normal spontaneous vaginal delivery)      Past Medical History:   Diagnosis Date     Alcohol abuse      Bipolar 2 disorder (H)      Deliberate self-cutting      Depression      Drug abuse, opioid type (H)      PTSD (post-traumatic stress disorder)      Suicidal ideation        Family History   Problem Relation Age of Onset     Bipolar disorder Mother      Suicidality Father      Depression Sister      Anxiety disorder Sister      COPD Maternal Grandmother      Heart attack Maternal Grandfather      No Medical Problems Paternal Grandmother      No Medical Problems Paternal Grandfather      Depression Sister      Anxiety disorder Sister      Cystic fibrosis Cousin      Cystic fibrosis Cousin        Objective:     /70   Pulse (!) 104   Wt 121 lb 6.4 oz (55.1 kg)   SpO2 99%   BMI 20.20 kg/m      Patient is alert, in no obvious distress.   Skin: Warm, dry.    Neck: Supple, no lymphadenopathy No thyromegaly.  Lungs:  Clear to auscultation. Respirations even and unlabored.  No wheezing or rales noted.   Heart:  Regular rate and rhythm.  No  murmurs, S3, S4, gallops, or rubs.    Musculoskeletal:  Full ROM of extremities.

## 2021-06-16 NOTE — TELEPHONE ENCOUNTER
Left message to call back for: test results within this encounter   Information to relay to patient:  Dr Carrasco notes below

## 2021-06-16 NOTE — TELEPHONE ENCOUNTER
Patient saw Dr. Carrasco yesterday and forgot to request a refill on her buspirone 5 mg three times a day.    Please send to the pharmacy if appropriate.

## 2021-06-16 NOTE — PROGRESS NOTES
Hospital Follow-up Visit:    Hospital/Nursing Home/IP Rehab Facility: [facility]  Date of Admission: 2/25/21  Date of Discharge: 2/27/21   Reason(s) for Admission: Anxiety    Post Discharge Medication Reconciliation: Completed by myself      Summary of hospitalization:  [facility] discharge summary reviewed  [facility] discharge summary reviewed    Patient presented with somnolence after Phenibut overdose. In ER, due to respiratory depression and altered mental status, she was intubated for airway protection. Her BMP, LFT and CBC were normal. Her urine drug screen was negative. [place] control contacted and recommended supportive care. Patient was extubated shortly after admission. Psychiatry was consulted. It was determined that patient's drug overdose is unintentional and she is not suicidal. She was started on buspar. Outpatient psychiatry and psycology resources were provided. Patient was referred to supportive group. Patient remained asymptomatic and her lab tests remained normal for the rest of her hospital stay. Patient was discharged home in a stable condition.     Diagnostic Tests/Treatments reviewed:  Hypernatremia and hypokalemia that resolved before discharge    Other Healthcare Providers Involved in Patient's Care:  Psychiatry      Update since discharge: improved.     She is taking buspar and having some side effects such as restlessness, insomnia, some minor panic attacks, reduced appetite. She has got connected with [facility], and has a therapist.  She overall feels things are improving and her anxiety is getting better.    PHQ9 today of 9  HANG 7 of 7         Review of Systems:   Complete ROS negative except as noted in the HPI            Physical Exam:   /70   Pulse 73   Wt 121 lb 4 oz (55 kg)   BMI 20.18 kg/m      General appearance: Alert, cooperative, no distress, appears stated age  Head: Normocephalic, atraumatic, without obvious abnormality  Eyes:  Pupils equal round, reactive.  Conjunctiva clear.  Neck: Supple, symmetric, trachea midline, no adenopathy.  Lungs: Clear to auscultation bilaterally, no wheezing or crackles present.  Respirations unlabored  Heart: Regular rate and rhythm, normal S1 and S2, no murmur, rub or gallop.  Extremities: Extremities normal, atraumatic.  No cyanosis or edema.  Skin: Skin color, texture, turgor normal no rashes or lesions on limited skin exam  Neurologic: CN II through XII intact, normal strength.    Assessment and Plan     25-year-old with past medical history of bipolar 2 disorder and generalized anxiety disorder who was recently hospitalized for overdose related to Phenibut.  On discharge now working with therapist and CBT as well as [facility] substance abuse counselor.  We will supposed to get started with psychiatry but states she has not been called about this.  We will place a referral today for psychiatry and recommended if she has not heard from them in the next couple days to give our clinic a call and we can assist her in scheduling this.  Due for influenza vaccines will give today.  Finally we will recheck BMP as she had some hyponatremia and mild hypokalemia during hospitalization.    1. Hypernatremia  2. Hypokalemia  - Basic Metabolic Panel    3. Bipolar 2 disorder, major depressive episode (H)  4. Anxiety  - AMB REFERRAL TO MENTAL HEALTH AND ADDICTION  - Adult (18+); Psychiatry, ECT and Medication Management; Psychiatry; [facility] [phone]; We will contact you to schedule the appointment or please call with any ques...    5. Need for vaccination  - Influenza, Seasonal Quad, PF =/> 6months        Fletcher Huang MD

## 2021-06-16 NOTE — PROGRESS NOTES
Please call the patient and give them my message below,    Jerome,  Your results from your recent clinic visit show:  Your BMP was normal with normal electrolytes and kidney function except for your potassium which as only mildly elevated. I would just recheck this at a later visit.    If you have more questions please send me a MyChart message saying you saw me or call the clinic    Dr. Fletcher Huang

## 2021-06-16 NOTE — TELEPHONE ENCOUNTER
----- Message from Fletcher Carrasco MD sent at 3/17/2021  7:29 AM CDT -----  Please call the patient and give them my message below,    Jerome,  Your results from your recent clinic visit show:  Your BMP was normal with normal electrolytes and kidney function except for your potassium which as only mildly elevated. I would just recheck this at a later visit.    If you have more questions please send me a MyChart message saying you saw me or call the clinic    Dr. Fletcher Carrasco

## 2021-06-17 NOTE — PATIENT INSTRUCTIONS - HE
Patient Instructions by Yoon Villafuerte MD at 2019  2:00 PM     Author: Yoon Villafuerte MD Service: -- Author Type: Physician    Filed: 2019  2:05 PM Encounter Date: 2019 Status: Addendum    : Yoon Villafuerte MD (Physician)    Related Notes: Original Note by Yoon Villafuerte MD (Physician) filed at 2019  2:02 PM       Phone Numbers:  [facility]: [phone]  [facility]: [phone]    When to call or come in to the hospital   If you have any bleeding or leakage of fluids.   If you have lower abdominal/uterine cramping not relieved with rest and fluids.  If you have a headache not resolved with tylenol, right upper abdominal pain, or sudden onset of swelling.  You know your body best. Never hesitate to call or go to the hospital if something doesn't feel right!    Fetal survey ultrasound  We will arrange for an ultrasound around 20-22 weeks gestation. This is the ultrasound where we look to see all parts of baby to make sure baby is growing normally! Many families look forward to this visit as a chance to try and determine the babies gender.    Consider childbirth education classes  Childbirth classes are a great way to learn what to expect from the remainder of pregnancy, tips for preparing and handeling the stresses of labor, and learning more about your . Classes are also great for learning more about breastfeeding!    For the restless legs:  -try magnesium chloride 64 or 71.5mg tablets - once per day  -try iron sulfate 325mg tablets - once per day  -keep taking prenatal vitamins    Heartburn safest meds in pregnancy:  -zantac 150mg two times a day   -TUMS or Maalox as needed additionally  -unfortunately omeprazole isn't the safest in pregnancy    Patient Education     Lifestyle Changes for Controlling GERD  When you have GERD, stomach acid feels as if its backing up toward your mouth. Whether or not you take medicine to control your GERD, your symptoms can often be  improved with lifestyle changes. Talk to your healthcare provider about the following suggestions. These suggestions may help you get relief from your symptoms.      Raise your head  Reflux is more likely to strike when youre lying down flat, because stomach fluid can flow backward more easily. Raising the head of your bed 4 to 6 inches can help. To do this:    Slide blocks or books under the legs at the head of your bed. Or, place a wedge under the mattress. Many Ceon can make a suitable wedge for you. The wedge should run from your waist to the top of your head.    Dont just prop your head on several pillows. This increases pressure on your stomach. It can make GERD worse.  Watch your eating habits  Certain foods may increase the acid in your stomach or relax the lower esophageal sphincter. This makes GERD more likely. Its best to avoid the following if they cause you symptoms:    Coffee, tea, and carbonated drinks (with and without caffeine)    Fatty, fried, or spicy food    Mint, chocolate, onions, and tomatoes    Peppermint    Any other foods that seem to irritate your stomach or cause you pain  Relieve the pressure  Tips include the following:    Eat smaller meals, even if you have to eat more often.    Dont lie down right after you eat. Wait a few hours for your stomach to empty.    Avoid tight belts and tight-fitting clothes.    Lose excess weight.  Tobacco and alcohol  Avoid smoking tobacco and drinking alcohol. They can make GERD symptoms worse.

## 2021-06-17 NOTE — TELEPHONE ENCOUNTER
Refill Approved    Rx renewed per Medication Renewal Policy. Medication was last renewed on 3/17/21.    Deedee Snow, [facility] Triage/Med Refill 5/2/2021     Requested Prescriptions   Pending Prescriptions Disp Refills     busPIRone (BUSPAR) 5 MG tablet 90 tablet 0     Sig: Take 1 tablet (5 mg total) by mouth 3 (three) times a day.       Tricyclics/Misc Antidepressant/Antianxiety Meds Refill Protocol Passed - 5/2/2021  4:03 PM        Passed - PCP or prescribing provider visit in last year     Last office visit with prescriber/PCP: 4/20/2021 Norma Green CNP OR same dept: 4/20/2021 Norma Green CNP OR same specialty: 4/20/2021 Norma Green CNP  Last physical: Visit date not found Last MTM visit: Visit date not found   Next visit within 3 mo: Visit date not found  Next physical within 3 mo: Visit date not found  Prescriber OR PCP: Norma Green CNP  Last diagnosis associated with med order: 1. Bipolar 2 disorder, major depressive episode (H)  - busPIRone (BUSPAR) 5 MG tablet; Take 1 tablet (5 mg total) by mouth 3 (three) times a day.  Dispense: 90 tablet; Refill: 0    If protocol passes may refill for 12 months if within 3 months of last provider visit (or a total of 15 months).

## 2021-06-17 NOTE — TELEPHONE ENCOUNTER
Reason for call:  Patient reporting a symptom    Symptom or request: Last week     Duration (how long have symptoms been present): a week     Have you been treated for this before? NA - side effects from possible Nexplanon Insertion    Additional comments: Patient has been experiencing very bad mood swings and is wanting some peace of mind if this is normal to be experiencing these symptoms right after getting nexplanon. Patient stated that she got her period pretty heavy last night as well but it was her normal heavy flow. Please call patient back to talk about possible symptoms/side effects.     Phone Number patient can be reached at:    Cell number on file:    Telephone Information:   Mobile [phone]       Best Time:  Anytime     Can we leave a detailed message on this number: Yes    Call taken on 5/21/2021 at 1:44 PM by Karmen Webb

## 2021-06-17 NOTE — TELEPHONE ENCOUNTER
Left message to call back for: Appt for nexplanon tues  with dr. delacruz  Information to relay to patient:  LM for pt, can she come at 11 instead of 1130? Dr. Grande will be out for  at 12pm. LM for pt to call back to confirm 11:00 works for her.

## 2021-06-17 NOTE — TELEPHONE ENCOUNTER
Patient Returning Call  Reason for call:    to confirm appt. time change to 11:00 AM instead of 11:30 on 5/11/21, [facility] unable to change appt. due to hold on slot.    Information relayed to patient:  Appt. Time change to 11:00am    Patient has additional questions:  No  If YES, what are your questions/concerns:  N/a    Okay to leave a detailed message?: No call back needed

## 2021-06-17 NOTE — TELEPHONE ENCOUNTER
Patient calling for refill of buspirone.  See refill note.  Deedee Snow, RN  Triage Nurse Advisor    Additional Information    Caller requesting a refill, no triage required, and triager able to refill per unit policy    Protocols used: MEDICATION QUESTION CALL-A-AH

## 2021-06-17 NOTE — PROGRESS NOTES
Procedure: Nexplanon Insertion  Indication: Desire for contraception  Contraindications: None    Subjective: Patient desires contraception. She has been informed of all of her available options with respect to contraception. She is confident in her decision.    Objective.  Vitals:    05/11/21 1103   BP: 110/84   Pulse: 79   SpO2: 99%     Gen: NAD, slightly nervous  Psych: no pressured speech, no evidence of delusions    Procedure:  After adequate informed consent was provided and the consent form was signed, patient while in the supine position, the left arm was examined.   The left arm was positioned and at 10 cm proximal to the lateral epicondyle on inner arm but not between the muscles approx. 5 cc of 1% lidocaine with epinephrine was infiltrated into the anticipated insertion site after cleaning with alcohol pad. After adequate anesthesia was noted the area was prepped with Betadine. The Nexplanon was inserted without difficulty with the applicator, and was easily palpable in upper arm.   Sterile bandaid was applied, followed by a 4 x 4 gauze folded and held in place by Kerlix pressure bandage. No complications. Return precautions discussed.  Patient understands that removal should be done no later than 3 years.    Sabrina Grande MD  Family Medicine [facility]

## 2021-06-17 NOTE — TELEPHONE ENCOUNTER
Called and left a message indicating that mood swings could be a side effect of the Nexplanon, but would be unlikely.  I attempted to provide reassurance.

## 2021-06-19 NOTE — LETTER
Letter by Yoon Villafuerte MD at      Author: Yoon Villafuerte MD Service: -- Author Type: --    Filed:  Encounter Date: 6/26/2019 Status: (Other)         Jerome Avila  [address]  [place] [address]     June 26, 2019     Dear Ms. Avila,    Below are the results from your recent visit:    Resulted Orders   US OB > = 14 Weeks    Narrative    EXAM: US OB > = 14 WEEKS  LOCATION: [facility]  DATE/TIME: 6/25/2019 5:06 PM    INDICATION: Pregnant, fetal survey check.  COMPARISON: 4/15/2019  TECHNIQUE: Routine.    FINDINGS:   Single intrauterine gestation, breech presentation. Placenta is located anterior. Amniotic fluid is normal. Uterus is normal. Maternal adnexa (right and left ovaries) show no abnormalities.    FETAL ANOMALY SCREEN:    Normal fetal survey. Specifically, normal posterior fossa, lateral ventricles, spine, stomach, bladder, kidneys, cord insertion, three-vessel cord, four-chamber heart, outflow tracts, extremities, face, and diaphragms.     BIOMETRY:  Biparietal Diameter: 4.1 cm, 18 weeks 3 days  Head Circumference: 16.6 cm, 19 weeks 2 days  Abdominal Circumference: 13.2 cm, 18 weeks 5 days  Femur Length: 3.2 cm, 20 weeks 0 days    Estimated Fetal Weight: 282 +/- 41 g  EFW Percentile: 35%    Fetal Heart Rate: 153 bpm  Cervical Length: 4.6 cm  Distance from Placenta to Cervix: 3.2 cm    EDC by First US exam: 11/12/2019  EDC by This US exam: 11/18/2019    Composite Age by First US: 20 weeks 0 days   Composite Age by This US: 19 weeks 1 day      Impression    CONCLUSION:    1.  Single living intrauterine gestation.  2.  Based on this ultrasound, composite age of 19 weeks 1 day with EDC 11/18/2019.  3.  Normal interval growth.  4.  Normal fetal survey.     Your recent ultrasound looks normal. All parts of baby are growing and developing normally. Baby is currently breech but that is fine at this gestational age. Your placenta is located at the front of your uterus and far from the  cervix - this is a good location. Your amniotic fluid level is normal.     Please call with questions or contact us using MyChart.    Sincerely,        Yoon Villafuerte MD

## 2021-06-19 NOTE — LETTER
Letter by Yoon Villafuerte MD at      Author: Yoon Villafuerte MD Service: -- Author Type: --    Filed:  Encounter Date: 4/11/2019 Status: (Other)         Jerome Avila  [address]  [place] [facility] [place] [facility] [address]     April 11, 2019     Dear Ms. Avila,    Below are the results from your recent visit:  Resulted Orders   ABO/RH Typing (OP order)   Result Value Ref Range    HML ABO/Rh Typing B POS     HML ABO/Rh Repeat Typing B POS    Hepatitis B Surface antigen (HBsAG)   Result Value Ref Range    Hepatitis B Surface Ag Negative Negative   HIV Antigen/Antibody Screening Cascade   Result Value Ref Range    HIV Antigen / Antibody Negative Negative   HML Antibody Screen   Result Value Ref Range    HML Antibody Screen Negative Negative   RPR   Result Value Ref Range    Treponema Antibody (Syphilis) Negative Negative   Rubella Immune Status (IgG)   Result Value Ref Range    Rubella Antibody, IgG Positive     Narrative    Negative: Absence of detectable rubella virus IgG antibodies. A negative result presumes that immunity has not been acquired.   Equivocal: Suggest recollection.  Positive: Considered positive for IgG antibodies to rubella virus.   Urinalysis Macroscopic   Result Value Ref Range    Color, UA Yellow Colorless, Yellow, Straw, Light Yellow    Clarity, UA Clear Clear    Glucose, UA Negative Negative    Bilirubin, UA Negative Negative    Ketones, UA Negative Negative    Specific Gravity, UA 1.025 1.005 - 1.030    Blood, UA Moderate (!) Negative    pH, UA 6.0 5.0 - 8.0    Protein, UA Trace (!) Negative mg/dL    Urobilinogen, UA 0.2 E.U./dL 0.2 E.U./dL, 1.0 E.U./dL    Nitrite, UA Negative Negative    Leukocytes, UA Negative Negative   Culture, Urine   Result Value Ref Range    Culture No Growth    Chlamydia/gonorrhoeae CERVIX   Result Value Ref Range    Chlamydia trachomatis, Amplified Detection Negative Negative    Neisseria gonorrhoeae, Amplified Detection Negative Negative   HM2(CBC w/o Differential)    Result Value Ref Range    WBC 10.5 4.0 - 11.0 thou/uL    RBC 4.29 3.80 - 5.40 mill/uL    Hemoglobin 13.2 12.0 - 16.0 g/dL    Platelets 271 140 - 440 thou/uL   Drug Abuse 1+, Urine   Result Value Ref Range    Amphetamines Screen Negative Screen Negative    Benzodiazepines Screen Negative Screen Negative    Opiates Screen Negative Screen Negative    Phencyclidine Screen Negative Screen Negative    THC Screen Negative Screen Negative    Barbiturates Screen Negative Screen Negative    Cocaine Metabolite Screen Negative Screen Negative    Methadone Screen Negative Screen Negative    Oxycodone Screen Negative Screen Negative   Wet Prep, Vaginal   Result Value Ref Range    Yeast Result No yeast seen No yeast seen    Trichomonas No Trichomonas seen No Trichomonas seen    Clue Cells, Wet Prep No Clue cells seen No Clue cells seen     Your blood type is B positive -you do not have any unusual antibodies in your blood that could harm baby. You tested negative for HIV, syphilis, gonorrhea, chlamydia, and hepatitis. Your blood counts are normal. There is no infection in your urine. Your drug screen was negative. You do not have a vaginal infection. You tested positive for rubella which is great news as it means your prior vaccinations were successful.    Please call with questions or contact us using AMS VariCode.    Sincerely,         Yoon Villafuerte MD

## 2021-06-19 NOTE — LETTER
Letter by Yoon Villafuerte MD at      Author: Yoon Villafuerte MD Service: -- Author Type: --    Filed:  Encounter Date: 10/16/2019 Status: Signed         Jerome Avila  [address]  [place] [address]       October 16, 2019     Dear Ms. Avila,    Below are the results from your recent visit:    Resulted Orders   Group B Strep Screen by PCR   Result Value Ref Range    Group B Strep Negative Negative    Allergic to Penicillin No         You tested negative for group B strep bacteria, this is great news.  You will not need antibiotics during your labor.    Please call with questions or contact us using Zub[employer].    Sincerely,        Yoon Villafuerte MD

## 2021-06-19 NOTE — LETTER
Letter by Yoon Villafuerte MD at      Author: Yoon Villafuerte MD Service: -- Author Type: --    Filed:  Encounter Date: 4/11/2019 Status: (Other)         Jerome Avila  [address]  [place] [facility] [place] [facility] [address]       April 11, 2019        Dear Ms. Avila,      Below are the results from your recent visit:    Resulted Orders   Gynecologic Cytology (PAP Smear)   Result Value Ref Range    Interpretation  Negative for squamous intraepithelial lesion or malignancy.      Negative for squamous intraepithelial lesion or malignancy    Result Flag Normal Normal     Your pap smear result was normal - your next pap smear will be in 3 years.    Please call with questions or contact us using [employer].    Sincerely,         Yoon Villafuerte MD

## 2021-06-20 ENCOUNTER — HEALTH MAINTENANCE LETTER (OUTPATIENT)
Age: 26
End: 2021-06-20

## 2021-06-21 NOTE — PROGRESS NOTES
Hospital discharge follow up call to pt, line busy.  RN will try back another time.       Pamela Antony RN Care Manager, Population Health

## 2021-06-21 NOTE — PROGRESS NOTES
Second attempt to reach patient for hospital discharge follow up.  No answer.  RN has made two unsuccessful attempts to reach patient.   Encounter closed.         Pamela Antony RN Care Manager, Population Health
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
## 2021-06-23 NOTE — TELEPHONE ENCOUNTER
Refill Request  Did you contact pharmacy: Yes  Medication name: Escitalopram- she doesn't want her dosage changed. Normal dosage 20mg once per day.   Requested Prescriptions      No prescriptions requested or ordered in this encounter     Who prescribed the medication: PCP  Pharmacy Name and Location: n/a  Is patient out of medication: Yes  Patient notified refills processed in 72 hours:  yes  Okay to leave a detailed message: yes

## 2021-06-23 NOTE — TELEPHONE ENCOUNTER
The escitalopram that is on file is for 10 mg once daily  Please send an rx for escitalopram 20 mg if appropriate

## 2021-06-23 NOTE — TELEPHONE ENCOUNTER
Medication Request  Medication name:  Escitalopram 10 mg  Pharmacy Name and Location:  [facility]  Reason for request:  Patient is out of the medication  When did you use medication last?:   Last   Okay to leave a detailed message: yes

## 2021-06-23 NOTE — TELEPHONE ENCOUNTER
Patient Returning Call  Reason for call:  patient  Information relayed to patient:   Please clarify what dose of escitalopram patient is taking? Is she requesting a refill of this?   Patient has additional questions:  Yes  If YES, what are your questions/concerns:  Patient would like to stay on the 20 mg of the escitalopram as well request a refill  Okay to leave a detailed message?: Yes

## 2021-06-23 NOTE — TELEPHONE ENCOUNTER
Left message to call back for: medication refill  Information to relay to patient:  Please clarify what dose of escitalopram patient is taking? Is she requesting a refill of this?

## 2021-06-25 NOTE — TELEPHONE ENCOUNTER
New Appointment Needed  What is the reason for the visit:    Pregnancy Confirmation Appt Needed  When was the first day of your last menstrual cycle?: January ??  Have you done a home pregnancy test?: Yes: positive.    Provider Preference: Any available  How soon do you need to be seen?: asap  Waitlist offered?: No  Okay to leave a detailed message:  Yes    Approx 4 weeks

## 2021-07-14 PROBLEM — Z34.90 PREGNANT: Status: RESOLVED | Noted: 2019-11-04 | Resolved: 2020-06-10

## 2021-08-03 PROBLEM — F10.20 SEVERE ALCOHOL USE DISORDER (H): Chronic | Status: RESOLVED | Noted: 2018-10-25 | Resolved: 2020-06-10

## 2021-10-02 ENCOUNTER — MYC REFILL (OUTPATIENT)
Dept: FAMILY MEDICINE | Facility: CLINIC | Age: 26
End: 2021-10-02

## 2021-10-02 DIAGNOSIS — F31.81 BIPOLAR 2 DISORDER, MAJOR DEPRESSIVE EPISODE (H): ICD-10-CM

## 2021-10-03 RX ORDER — BUSPIRONE HYDROCHLORIDE 5 MG/1
5 TABLET ORAL 3 TIMES DAILY
Qty: 90 TABLET | Refills: 2 | Status: SHIPPED | OUTPATIENT
Start: 2021-10-03 | End: 2022-02-03

## 2021-10-03 NOTE — TELEPHONE ENCOUNTER
"Last Written Prescription Date:  5/2/21  Last Fill Quantity: 90,  # refills: 3   Last office visit provider:  5/11/21     Requested Prescriptions   Pending Prescriptions Disp Refills     busPIRone (BUSPAR) 5 MG tablet 90 tablet 3     Sig: Take 1 tablet (5 mg) by mouth 3 times daily       Atypical Antidepressants Protocol Passed - 10/2/2021 10:54 PM        Passed - Recent (12 mo) or future (30 days) visit within the authorizing provider's specialty     Patient has had an office visit with the authorizing provider or a provider within the authorizing providers department within the previous 12 mos or has a future within next 30 days. See \"Patient Info\" tab in inbasket, or \"Choose Columns\" in Meds & Orders section of the refill encounter.              Passed - Medication active on med list        Passed - Patient is age 18 or older        Passed - No active pregnancy on record        Passed - No positive pregnancy test in past 12 mos             taisha hewitt RN 10/03/21 9:15 AM  "

## 2021-10-10 ENCOUNTER — HEALTH MAINTENANCE LETTER (OUTPATIENT)
Age: 26
End: 2021-10-10

## 2022-01-05 ENCOUNTER — IMMUNIZATION (OUTPATIENT)
Dept: NURSING | Facility: CLINIC | Age: 27
End: 2022-01-05
Payer: COMMERCIAL

## 2022-01-05 PROCEDURE — 0054A COVID-19,PF,PFIZER (12+ YRS): CPT

## 2022-01-05 PROCEDURE — 91305 COVID-19,PF,PFIZER (12+ YRS): CPT

## 2022-03-09 DIAGNOSIS — F43.12 CHRONIC POST-TRAUMATIC STRESS DISORDER (PTSD): ICD-10-CM

## 2022-03-09 DIAGNOSIS — F33.1 MODERATE EPISODE OF RECURRENT MAJOR DEPRESSIVE DISORDER (H): ICD-10-CM

## 2022-03-09 NOTE — TELEPHONE ENCOUNTER
Reason for Call:  Medication or medication refill:    Do you use a [facility]?  Name of the pharmacy and phone number for the current request:  [id number] - [place], [place] - [address] [place] [address]  [phone]    Name of the medication requested: escitalopram (LEXAPRO) 10 MG tablet    Other request: Pharmacy already send in the request refill. Patient called to double check that request be send providers refill pool.    Can we leave a detailed message on this number? YES    Phone number patient can be reached at: Home number on file [phone] (home)    Best Time: Anytime    Call taken on 3/9/2022 at 11:59 AM by Suzie Johnson

## 2022-03-10 RX ORDER — ESCITALOPRAM OXALATE 10 MG/1
20 TABLET ORAL DAILY
Qty: 180 TABLET | Refills: 0 | Status: SHIPPED | OUTPATIENT
Start: 2022-03-10 | End: 2022-04-19

## 2022-03-10 NOTE — TELEPHONE ENCOUNTER
"Routing refill request to provider for review/approval because:  Labs not current:  PHQ9    Last Written Prescription Date:  8/16/21  Last Fill Quantity: 180,  # refills: 1   Last office visit provider:  5/11/21     Requested Prescriptions   Pending Prescriptions Disp Refills     escitalopram (LEXAPRO) 10 MG tablet [Pharmacy Med Name: Escitalopram Oxalate Oral Tablet 10 MG] 180 tablet 0     Sig: TAKE 2 TABLETS (20 MG) BY MOUTH DAILY       SSRIs Protocol Failed - 3/9/2022 12:04 PM        Failed - PHQ-9 score less than 5 in past 6 months     Please review last PHQ-9 score.           Failed - Recent (6 mo) or future (30 days) visit within the authorizing provider's specialty     Patient had office visit in the last 6 months or has a visit in the next 30 days with authorizing provider or within the authorizing provider's specialty.  See \"Patient Info\" tab in inbasket, or \"Choose Columns\" in Meds & Orders section of the refill encounter.            Passed - Medication is active on med list        Passed - Patient is age 18 or older        Passed - No active pregnancy on record        Passed - No positive pregnancy test in last 12 months             Keisha Knapp RN 03/10/22 8:06 AM  "

## 2022-04-19 ENCOUNTER — OFFICE VISIT (OUTPATIENT)
Dept: FAMILY MEDICINE | Facility: CLINIC | Age: 27
End: 2022-04-19
Payer: COMMERCIAL

## 2022-04-19 VITALS
BODY MASS INDEX: 27.26 KG/M2 | HEART RATE: 60 BPM | SYSTOLIC BLOOD PRESSURE: 100 MMHG | DIASTOLIC BLOOD PRESSURE: 60 MMHG | WEIGHT: 158.8 LBS

## 2022-04-19 DIAGNOSIS — F31.81 BIPOLAR 2 DISORDER, MAJOR DEPRESSIVE EPISODE (H): Chronic | ICD-10-CM

## 2022-04-19 DIAGNOSIS — F41.9 ANXIETY: Primary | ICD-10-CM

## 2022-04-19 DIAGNOSIS — Z87.898 HISTORY OF ALCOHOL USE DISORDER: ICD-10-CM

## 2022-04-19 DIAGNOSIS — F43.12 CHRONIC POST-TRAUMATIC STRESS DISORDER (PTSD): Chronic | ICD-10-CM

## 2022-04-19 PROCEDURE — 99214 OFFICE O/P EST MOD 30 MIN: CPT | Performed by: NURSE PRACTITIONER

## 2022-04-19 RX ORDER — FLUOXETINE 40 MG/1
40 CAPSULE ORAL DAILY
Qty: 30 CAPSULE | Refills: 0 | Status: SHIPPED | OUTPATIENT
Start: 2022-04-19 | End: 2022-07-25

## 2022-04-19 RX ORDER — GABAPENTIN 300 MG/1
300 CAPSULE ORAL 3 TIMES DAILY
Qty: 90 CAPSULE | Refills: 0 | Status: SHIPPED | OUTPATIENT
Start: 2022-04-19

## 2022-04-19 RX ORDER — NALTREXONE HYDROCHLORIDE 50 MG/1
50 TABLET, FILM COATED ORAL DAILY
COMMUNITY
End: 2024-09-11

## 2022-04-19 RX ORDER — GABAPENTIN 100 MG/1
100 CAPSULE ORAL 3 TIMES DAILY
COMMUNITY
End: 2022-04-19
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
## 2022-04-26 ASSESSMENT — ANXIETY QUESTIONNAIRES
5. BEING SO RESTLESS THAT IT IS HARD TO SIT STILL: NEARLY EVERY DAY
IF YOU CHECKED OFF ANY PROBLEMS ON THIS QUESTIONNAIRE, HOW DIFFICULT HAVE THESE PROBLEMS MADE IT FOR YOU TO DO YOUR WORK, TAKE CARE OF THINGS AT HOME, OR GET ALONG WITH OTHER PEOPLE: SOMEWHAT DIFFICULT
1. FEELING NERVOUS, ANXIOUS, OR ON EDGE: MORE THAN HALF THE DAYS
GAD7 TOTAL SCORE: 12
3. WORRYING TOO MUCH ABOUT DIFFERENT THINGS: MORE THAN HALF THE DAYS
7. FEELING AFRAID AS IF SOMETHING AWFUL MIGHT HAPPEN: SEVERAL DAYS
6. BECOMING EASILY ANNOYED OR IRRITABLE: MORE THAN HALF THE DAYS
2. NOT BEING ABLE TO STOP OR CONTROL WORRYING: SEVERAL DAYS

## 2022-04-26 ASSESSMENT — PATIENT HEALTH QUESTIONNAIRE - PHQ9
5. POOR APPETITE OR OVEREATING: SEVERAL DAYS
SUM OF ALL RESPONSES TO PHQ QUESTIONS 1-9: 11

## 2022-04-27 ASSESSMENT — ANXIETY QUESTIONNAIRES: GAD7 TOTAL SCORE: 12

## 2022-06-09 ENCOUNTER — TELEPHONE (OUTPATIENT)
Dept: FAMILY MEDICINE | Facility: CLINIC | Age: 27
End: 2022-06-09

## 2022-06-13 DIAGNOSIS — Z79.899 ENCOUNTER FOR LONG-TERM (CURRENT) USE OF MEDICATIONS: ICD-10-CM

## 2022-06-13 DIAGNOSIS — F43.10 POSTTRAUMATIC STRESS DISORDER: Primary | ICD-10-CM

## 2022-06-22 ENCOUNTER — LAB (OUTPATIENT)
Dept: LAB | Facility: CLINIC | Age: 27
End: 2022-06-22
Payer: COMMERCIAL

## 2022-06-22 DIAGNOSIS — F43.10 POSTTRAUMATIC STRESS DISORDER: ICD-10-CM

## 2022-06-22 DIAGNOSIS — Z79.899 ENCOUNTER FOR LONG-TERM (CURRENT) USE OF MEDICATIONS: ICD-10-CM

## 2022-06-22 LAB
ALBUMIN SERPL-MCNC: 4 G/DL (ref 3.5–5)
ALP SERPL-CCNC: 87 U/L (ref 45–120)
ALT SERPL W P-5'-P-CCNC: 15 U/L (ref 0–45)
ANION GAP SERPL CALCULATED.3IONS-SCNC: 6 MMOL/L (ref 5–18)
AST SERPL W P-5'-P-CCNC: 17 U/L (ref 0–40)
BASOPHILS # BLD AUTO: 0 10E3/UL (ref 0–0.2)
BASOPHILS NFR BLD AUTO: 1 %
BILIRUB SERPL-MCNC: 0.2 MG/DL (ref 0–1)
BUN SERPL-MCNC: 8 MG/DL (ref 8–22)
CALCIUM SERPL-MCNC: 9.4 MG/DL (ref 8.5–10.5)
CHLORIDE BLD-SCNC: 105 MMOL/L (ref 98–107)
CO2 SERPL-SCNC: 29 MMOL/L (ref 22–31)
CREAT SERPL-MCNC: 0.75 MG/DL (ref 0.6–1.1)
EOSINOPHIL # BLD AUTO: 0.1 10E3/UL (ref 0–0.7)
EOSINOPHIL NFR BLD AUTO: 2 %
ERYTHROCYTE [DISTWIDTH] IN BLOOD BY AUTOMATED COUNT: 12.4 % (ref 10–15)
GFR SERPL CREATININE-BSD FRML MDRD: >90 ML/MIN/1.73M2
GLUCOSE BLD-MCNC: 85 MG/DL (ref 70–125)
HBA1C MFR BLD: 5.1 % (ref 0–5.6)
HCT VFR BLD AUTO: 39.8 % (ref 35–47)
HGB BLD-MCNC: 13.5 G/DL (ref 11.7–15.7)
IMM GRANULOCYTES # BLD: 0 10E3/UL
IMM GRANULOCYTES NFR BLD: 0 %
LYMPHOCYTES # BLD AUTO: 2.2 10E3/UL (ref 0.8–5.3)
LYMPHOCYTES NFR BLD AUTO: 31 %
MCH RBC QN AUTO: 31.2 PG (ref 26.5–33)
MCHC RBC AUTO-ENTMCNC: 33.9 G/DL (ref 31.5–36.5)
MCV RBC AUTO: 92 FL (ref 78–100)
MONOCYTES # BLD AUTO: 0.6 10E3/UL (ref 0–1.3)
MONOCYTES NFR BLD AUTO: 8 %
NEUTROPHILS # BLD AUTO: 4.2 10E3/UL (ref 1.6–8.3)
NEUTROPHILS NFR BLD AUTO: 58 %
PLATELET # BLD AUTO: 273 10E3/UL (ref 150–450)
POTASSIUM BLD-SCNC: 4.8 MMOL/L (ref 3.5–5)
PROT SERPL-MCNC: 7.2 G/DL (ref 6–8)
RBC # BLD AUTO: 4.33 10E6/UL (ref 3.8–5.2)
SODIUM SERPL-SCNC: 140 MMOL/L (ref 136–145)
TSH SERPL DL<=0.005 MIU/L-ACNC: 3.58 UIU/ML (ref 0.3–5)
WBC # BLD AUTO: 7.2 10E3/UL (ref 4–11)

## 2022-06-22 PROCEDURE — 82306 VITAMIN D 25 HYDROXY: CPT

## 2022-06-22 PROCEDURE — 80050 GENERAL HEALTH PANEL: CPT

## 2022-06-22 PROCEDURE — 83036 HEMOGLOBIN GLYCOSYLATED A1C: CPT

## 2022-06-22 PROCEDURE — 36415 COLL VENOUS BLD VENIPUNCTURE: CPT

## 2022-06-23 LAB — DEPRECATED CALCIDIOL+CALCIFEROL SERPL-MC: 21 UG/L (ref 20–75)

## 2022-07-16 ENCOUNTER — HEALTH MAINTENANCE LETTER (OUTPATIENT)
Age: 27
End: 2022-07-16

## 2022-07-25 ENCOUNTER — OFFICE VISIT (OUTPATIENT)
Dept: FAMILY MEDICINE | Facility: CLINIC | Age: 27
End: 2022-07-25
Payer: COMMERCIAL

## 2022-07-25 VITALS
OXYGEN SATURATION: 99 % | TEMPERATURE: 96.8 F | SYSTOLIC BLOOD PRESSURE: 120 MMHG | BODY MASS INDEX: 26.25 KG/M2 | HEART RATE: 89 BPM | HEIGHT: 63 IN | DIASTOLIC BLOOD PRESSURE: 80 MMHG | WEIGHT: 148.13 LBS

## 2022-07-25 DIAGNOSIS — Z00.00 ENCOUNTER FOR ROUTINE HISTORY AND PHYSICAL EXAM IN FEMALE: Primary | ICD-10-CM

## 2022-07-25 DIAGNOSIS — J45.20 MILD INTERMITTENT ASTHMA WITHOUT COMPLICATION: ICD-10-CM

## 2022-07-25 DIAGNOSIS — F31.81 BIPOLAR 2 DISORDER, MAJOR DEPRESSIVE EPISODE (H): Chronic | ICD-10-CM

## 2022-07-25 DIAGNOSIS — F11.21 OPIOID DEPENDENCE, IN REMISSION (H): ICD-10-CM

## 2022-07-25 DIAGNOSIS — Z12.4 CERVICAL CANCER SCREENING: ICD-10-CM

## 2022-07-25 DIAGNOSIS — F17.200 SMOKING: ICD-10-CM

## 2022-07-25 DIAGNOSIS — Z87.898 HISTORY OF ALCOHOL USE DISORDER: ICD-10-CM

## 2022-07-25 DIAGNOSIS — Z11.59 NEED FOR HEPATITIS C SCREENING TEST: ICD-10-CM

## 2022-07-25 DIAGNOSIS — F43.12 CHRONIC POST-TRAUMATIC STRESS DISORDER (PTSD): Chronic | ICD-10-CM

## 2022-07-25 PROCEDURE — 86803 HEPATITIS C AB TEST: CPT | Performed by: NURSE PRACTITIONER

## 2022-07-25 PROCEDURE — 90677 PCV20 VACCINE IM: CPT | Performed by: NURSE PRACTITIONER

## 2022-07-25 PROCEDURE — 90471 IMMUNIZATION ADMIN: CPT | Performed by: NURSE PRACTITIONER

## 2022-07-25 PROCEDURE — 99395 PREV VISIT EST AGE 18-39: CPT | Mod: 25 | Performed by: NURSE PRACTITIONER

## 2022-07-25 PROCEDURE — G0145 SCR C/V CYTO,THINLAYER,RESCR: HCPCS | Performed by: NURSE PRACTITIONER

## 2022-07-25 PROCEDURE — 36415 COLL VENOUS BLD VENIPUNCTURE: CPT | Performed by: NURSE PRACTITIONER

## 2022-07-25 RX ORDER — PROPRANOLOL HYDROCHLORIDE 10 MG/1
1 TABLET ORAL 2 TIMES DAILY PRN
COMMUNITY
Start: 2022-06-04

## 2022-07-25 RX ORDER — VENLAFAXINE HYDROCHLORIDE 150 MG/1
150 CAPSULE, EXTENDED RELEASE ORAL DAILY
COMMUNITY

## 2022-07-25 ASSESSMENT — ASTHMA QUESTIONNAIRES
ACT_TOTALSCORE: 21
ACUTE_EXACERBATION_TODAY: NO
QUESTION_2 LAST FOUR WEEKS HOW OFTEN HAVE YOU HAD SHORTNESS OF BREATH: ONCE OR TWICE A WEEK
QUESTION_3 LAST FOUR WEEKS HOW OFTEN DID YOUR ASTHMA SYMPTOMS (WHEEZING, COUGHING, SHORTNESS OF BREATH, CHEST TIGHTNESS OR PAIN) WAKE YOU UP AT NIGHT OR EARLIER THAN USUAL IN THE MORNING: NOT AT ALL
ACT_TOTALSCORE: 21
QUESTION_1 LAST FOUR WEEKS HOW MUCH OF THE TIME DID YOUR ASTHMA KEEP YOU FROM GETTING AS MUCH DONE AT WORK, SCHOOL OR AT HOME: A LITTLE OF THE TIME
QUESTION_5 LAST FOUR WEEKS HOW WOULD YOU RATE YOUR ASTHMA CONTROL: WELL CONTROLLED
QUESTION_4 LAST FOUR WEEKS HOW OFTEN HAVE YOU USED YOUR RESCUE INHALER OR NEBULIZER MEDICATION (SUCH AS ALBUTEROL): ONCE A WEEK OR LESS

## 2022-07-25 NOTE — PROGRESS NOTES
Assessment and Plan:    Encounter for routine history and physical exam in female  Recommend consuming a healthy diet and exercising.  Provided pneumococcal vaccine due to smoking.    Need for hepatitis C screening test  - Hepatitis C Screen Reflex to HCV RNA Quant and Genotype  - Hepatitis C Screen Reflex to HCV RNA Quant and Genotype    Cervical cancer screening  - Pap Screen reflex to HPV if ASCUS - recommend age 25 - 29    Opioid dependence, in remission (H)  Bipolar 2 disorder, major depressive episode (H)  Chronic post-traumatic stress disorder (PTSD)  History of alcohol use disorder  She is seeing psychiatry.  She continues medications as prescribed.    Mild intermittent asthma without complication  She continues albuterol as needed.    Smoking  Discussed smoking cessation options, but she declines.    Subjective:     Jerome is a 27 year old female presenting to the clinic for a female physical.     LMP: spotting today; irregular with Nexplanon   Hx of abnormal pap smear: none   Last pap smear: 4/2/19 normal   Perform self-breast exams: none   Vaginal discharge or irritation: none   Sexually active: yes, in a relationship with a male for 9 years   Contraception: nexplanon   Concerns for STDs: none   Previous pregnancies:one pregnancy (vaginal delivery)   Daughter is 2 1/2 years old     Patient has a history of bipolar disorder, PTSD, alcohol use and opioid dependence.  She has been seeing psychiatry.  She did have a relapse of alcohol, but has been sober for 2 months.  She is prescribed venlafaxine, propranolol, naltrexone, and gabapentin.  She has a history of intermittent asthma diagnosed in childhood.  She uses an albuterol inhaler prior to exercise.    Review of systems:  I performed a 10 point review of systems.  All pertinent positives and negatives are noted in the HPI. All others are negative.     No Known Allergies    Current Outpatient Medications   Medication     albuterol (PROAIR HFA;PROVENTIL  HFA;VENTOLIN HFA) 90 mcg/actuation inhaler     FLUoxetine (PROZAC) 40 MG capsule     gabapentin (NEURONTIN) 300 MG capsule     naltrexone (DEPADE/REVIA) 50 MG tablet     No current facility-administered medications for this visit.       Social History     Socioeconomic History     Marital status: Single     Spouse name: Not on file     Number of children: Not on file     Years of education: Not on file     Highest education level: Not on file   Occupational History     Not on file   Tobacco Use     Smoking status: Former Smoker     Packs/day: 0.00     Quit date: 2014     Years since quittin.5     Smokeless tobacco: Former User     Quit date: 2019     Tobacco comment: e-cig smoker   Substance and Sexual Activity     Alcohol use: Not Currently     Alcohol/week: 2.0 standard drinks     Comment: Alcoholic Drinks/day: She reports binge drinking on days off (two days out of the week) and drinks beers occasionally before work     Drug use: No     Comment: Drug use: Hasn't used illicit drugs in 3 years     Sexual activity: Yes     Partners: Male     Comment: engaged   Other Topics Concern     Not on file   Social History Narrative     Not on file     Social Determinants of Health     Financial Resource Strain: Not on file   Food Insecurity: Not on file   Transportation Needs: Not on file   Physical Activity: Not on file   Stress: Not on file   Social Connections: Not on file   Intimate Partner Violence: Not on file   Housing Stability: Not on file       Past Medical History:   Diagnosis Date     Alcohol abuse      Bipolar 2 disorder (H)      Deliberate self-cutting      Depression      Drug abuse, opioid type (H)      PTSD (post-traumatic stress disorder)      Suicidal ideation        Family History   Problem Relation Age of Onset     Bipolar Disorder Mother      Suicidality Father      Depression Sister      Anxiety Disorder Sister      Chronic Obstructive Pulmonary Disease Maternal Grandmother      Coronary  "Artery Disease Maternal Grandfather      No Known Problems Paternal Grandmother      No Known Problems Paternal Grandfather      Depression Sister      Anxiety Disorder Sister      Cystic Fibrosis Cousin      Cystic Fibrosis Cousin        Past Surgical History:   Procedure Laterality Date     LAPAROSCOPY DIAGNOSTIC (GENERAL)      pelvic, looking for etiology of pain       Objective:     /80   Pulse 89   Temp 96.8  F (36  C)   Ht 1.607 m (5' 3.27\")   Wt 67.2 kg (148 lb 2 oz)   LMP 07/25/2022   SpO2 99%   BMI 26.02 kg/m      Patient is alert, no obvious distress.   Skin: Warm, dry.  No rashes or lesions. Skin turgor rapid return.   HEENT:  Eyes normal.  Ears normal.  Nose patent, mucosa pink.  Oropharynx mucosa pink, no lesions or tonsil enlargement.   Neck:  Supple, without lymphadenopathy, bruits, JVD. Thyroid normal texture and size.    Lungs:  Clear to auscultation.  No wheezing, rales noted.  Respirations even and unlabored.   Heart:  Regular rate and rhythm.  No murmurs.   Breasts:  Normal.  No surrounding adenopathy.   Abdomen: Soft, nontender.  No organomegaly.  Bowel sounds normoactive.  No guarding or masses noted.   :  External genitalia normal.  Normal vaginal mucosa. Brown vaginal discharge is present.  Cervix no lesions or cervical motion tenderness.   Musculoskeletal:  Full ROM of extremities.  Muscle strength equal +5/5.   Neurological:  Cranial nerves 2-12 intact.             "

## 2022-07-26 LAB — HCV AB SERPL QL IA: NONREACTIVE

## 2022-07-29 LAB
BKR LAB AP GYN ADEQUACY: NORMAL
BKR LAB AP GYN INTERPRETATION: NORMAL
BKR LAB AP HPV REFLEX: NORMAL
BKR LAB AP LMP: NORMAL
BKR LAB AP PREVIOUS ABNORMAL: NORMAL
PATH REPORT.COMMENTS IMP SPEC: NORMAL
PATH REPORT.COMMENTS IMP SPEC: NORMAL
PATH REPORT.RELEVANT HX SPEC: NORMAL

## 2022-09-18 ENCOUNTER — HEALTH MAINTENANCE LETTER (OUTPATIENT)
Age: 27
End: 2022-09-18

## 2022-10-17 ENCOUNTER — OFFICE VISIT (OUTPATIENT)
Dept: FAMILY MEDICINE | Facility: CLINIC | Age: 27
End: 2022-10-17
Payer: COMMERCIAL

## 2022-10-17 VITALS
DIASTOLIC BLOOD PRESSURE: 64 MMHG | OXYGEN SATURATION: 99 % | SYSTOLIC BLOOD PRESSURE: 112 MMHG | TEMPERATURE: 97.6 F | WEIGHT: 149.7 LBS | HEART RATE: 75 BPM | BODY MASS INDEX: 26.29 KG/M2

## 2022-10-17 DIAGNOSIS — Z30.46 NEXPLANON REMOVAL: ICD-10-CM

## 2022-10-17 DIAGNOSIS — Z30.011 ENCOUNTER FOR INITIAL PRESCRIPTION OF CONTRACEPTIVE PILLS: Primary | ICD-10-CM

## 2022-10-17 PROCEDURE — 99212 OFFICE O/P EST SF 10 MIN: CPT | Mod: 25 | Performed by: FAMILY MEDICINE

## 2022-10-17 PROCEDURE — 90471 IMMUNIZATION ADMIN: CPT | Performed by: FAMILY MEDICINE

## 2022-10-17 PROCEDURE — 11982 REMOVE DRUG IMPLANT DEVICE: CPT | Performed by: FAMILY MEDICINE

## 2022-10-17 PROCEDURE — 90686 IIV4 VACC NO PRSV 0.5 ML IM: CPT | Performed by: FAMILY MEDICINE

## 2022-10-17 RX ORDER — LEVONORGESTREL/ETHIN.ESTRADIOL 0.1-0.02MG
1 TABLET ORAL DAILY
Qty: 84 TABLET | Refills: 3 | Status: SHIPPED | OUTPATIENT
Start: 2022-10-17 | End: 2022-10-27

## 2022-10-17 RX ORDER — VENLAFAXINE HYDROCHLORIDE 75 MG/1
CAPSULE, EXTENDED RELEASE ORAL
COMMUNITY
Start: 2022-09-29 | End: 2023-09-07

## 2022-10-17 NOTE — PROGRESS NOTES
Assessment & Plan     Encounter for initial prescription of contraceptive pills  Discussed contraceptive options with patient.  Discussed IUDs including the Mirena and ParaGard IUD.  Discussed procedure for insertion.  She will consider this as an option.  In the meantime she would like a prescription for birth control pills.  Prescription sent to pharmacy.  Counseled her to start medication as soon as possible and use backup form of contraception for 1 month.  - levonorgestrel-ethinyl estradiol (AVIANE) 0.1-20 MG-MCG tablet  Dispense: 84 tablet; Refill: 3    Nexplanon removal    - REMOVAL NEXPLANON Procedure and risks discussed with patient who voiced understanding and agreed to proceed with removal of implant.  Implant was identified in medial aspect of left upper arm and pen used to lorna distal end of implant.  Skin cleansed with alcohol swab.  Local anesthetic administered with 1% lidocaine with epinephrine, approximately 2.5 mL. Skin was cleansed with 3 Betadine swabs.  Area draped in sterile fashion.  #15 scalpel was used to make an approximately 1 cm incision near the distal end of implant.  Subcutaneous tissue was dissected and distal end of implant was identified and grasped with a needle licea.  Subcutaneous skin was further dissected and the implant was then removed in its entirety and placed in biohazard bag for disposal.  Patient tolerated procedure well.  Estimated blood loss minimal.  Skin cleansed with soap and water.  Steri-Strips placed over her incision site to approximate wound edges.  Bandage placed over incision and compression dressing applied.  Patient counseled to leave compression dressing on for 24 hours and then keep wound covered with bandage for 2-3 days.  Discussed she may experience bruising and soreness for the next couple of days.  May use ibuprofen and ice packs as needed for discomfort.  Monitor for signs of infection.  Follow-up if any concerns or problems.    HCM  Influenza  "vaccine administered             Nicotine/Tobacco Cessation:  She reports that she has been smoking cigarettes. She has been smoking an average of .5 packs per day. She has never used smokeless tobacco.  Nicotine/Tobacco Cessation Plan:         BMI:   Estimated body mass index is 26.29 kg/m  as calculated from the following:    Height as of 7/25/22: 1.607 m (5' 3.27\").    Weight as of this encounter: 67.9 kg (149 lb 11.2 oz).           No follow-ups on file.    Dian Saenz MD  [facility] Cano is a 27 year old, presenting for the following health issues:  nexplanon removal      HPI   She comes in today for Nexplanon removal.  Had Nexplanon placed in May 2021.  Desires removal due to adverse side effects.  Has been experiencing irregular menstrual bleeding.  Would like influenza vaccine today.  Would also like to discuss alternative contraceptive options.  Has used birth control pills in the past.  She is considering an IUD.  Meds and allergies reviewed.  No other concerns or questions        Review of Systems         Objective    /64 (BP Location: Left arm, Cuff Size: Adult Regular)   Pulse 75   Temp 97.6  F (36.4  C) (Temporal)   Wt 67.9 kg (149 lb 11.2 oz)   LMP 10/15/2022   SpO2 99%   BMI 26.29 kg/m    Body mass index is 26.29 kg/m .  Physical Exam   Gen: alert, no acute distress  Ext: warm, dry,nexplanon palpated in medial aspect of left upper arm  "

## 2022-10-27 ENCOUNTER — OFFICE VISIT (OUTPATIENT)
Dept: FAMILY MEDICINE | Facility: CLINIC | Age: 27
End: 2022-10-27
Payer: COMMERCIAL

## 2022-10-27 VITALS
OXYGEN SATURATION: 99 % | TEMPERATURE: 97.6 F | WEIGHT: 151 LBS | HEART RATE: 89 BPM | BODY MASS INDEX: 26.52 KG/M2 | SYSTOLIC BLOOD PRESSURE: 118 MMHG | DIASTOLIC BLOOD PRESSURE: 72 MMHG

## 2022-10-27 DIAGNOSIS — Z30.430 ENCOUNTER FOR IUD INSERTION: Primary | ICD-10-CM

## 2022-10-27 LAB — HCG UR QL: NEGATIVE

## 2022-10-27 PROCEDURE — 58300 INSERT INTRAUTERINE DEVICE: CPT | Performed by: FAMILY MEDICINE

## 2022-10-27 PROCEDURE — 81025 URINE PREGNANCY TEST: CPT | Performed by: FAMILY MEDICINE

## 2022-10-27 PROCEDURE — 99207 PR DROP WITH A PROCEDURE: CPT | Performed by: FAMILY MEDICINE

## 2022-10-27 ASSESSMENT — PATIENT HEALTH QUESTIONNAIRE - PHQ9
10. IF YOU CHECKED OFF ANY PROBLEMS, HOW DIFFICULT HAVE THESE PROBLEMS MADE IT FOR YOU TO DO YOUR WORK, TAKE CARE OF THINGS AT HOME, OR GET ALONG WITH OTHER PEOPLE: SOMEWHAT DIFFICULT
SUM OF ALL RESPONSES TO PHQ QUESTIONS 1-9: 5
SUM OF ALL RESPONSES TO PHQ QUESTIONS 1-9: 5

## 2022-10-27 ASSESSMENT — PAIN SCALES - GENERAL: PAINLEVEL: NO PAIN (0)

## 2022-10-27 NOTE — PROGRESS NOTES
"  Assessment & Plan     Encounter for IUD insertion    - HCG Qual, Urine ([id number])  - levonorgestrel (MIRENA) 20 MCG/DAY IUD 20 mcg  - OK INSERTION OF IUD FOR THERAPEUTIC PURPOSES    Discussed procedure for IUD insertion with patient.  Discussed risks of bleeding, discomfort, infection, uterine perforation and uterine expulsion.  Consent form signed and will be scanned into chart.  Pregnancy test negative.  Cervix visualized with speculum.  Cervix cleansed with 3 Betadine swabs.  Tenaculum placed.  Uterus sounded to a depth of 8.5 cm.  IUD inserted according to 's instructions.  Strings cut to approximately 1.5 inches in length.  Patient tolerated procedure well.  Advised pelvic rest for 1 week.  Discussed she may experience intermittent bleeding and cramping for next 3 to 6 months.  May use ibuprofen or Tylenol as needed for discomfort.  Follow-up in 1 month for IUD check recommended.  Follow-up sooner if she experiences any symptoms of concern such as severe abdominal pain, heavy bleeding, fevers or chills, abnormal vaginal discharge.  Lot number of this IUD is  L2.  Should be removed no later than 10/27/2030.             BMI:   Estimated body mass index is 26.52 kg/m  as calculated from the following:    Height as of 7/25/22: 1.607 m (5' 3.27\").    Weight as of this encounter: 68.5 kg (151 lb).       Depression Screening Follow Up    PHQ 10/27/2022   PHQ-9 Total Score 5   Q9: Thoughts of better off dead/self-harm past 2 weeks Several days   F/U: Thoughts of suicide or self-harm No   F/U: Safety concerns No                 Follow Up    Follow Up Actions Taken  Crisis resource information provided in the After Visit Summary  Referred patient back to mental health provider    Discussed the following ways the patient can remain in a safe environment:  be around others      No follow-ups on file.    Dian Saenz MD  [facility] is a 27 year old, " presenting for the following health issues:  iud placement      HPI         She presents today for IUD insertion.  No other concerns or questions.  Medications and allergies reviewed and updated      Review of Systems         Objective    /72 (BP Location: Right arm, Cuff Size: Adult Regular)   Pulse 89   Temp 97.6  F (36.4  C) (Temporal)   Wt 68.5 kg (151 lb)   LMP 10/15/2022   SpO2 99%   BMI 26.52 kg/m    Body mass index is 26.52 kg/m .  Physical Exam   GENERAL: healthy, alert and no distress   (female): normal female external genitalia, normal urethral meatus , vaginal mucosa pink, moist, well rugated and normal cervix  PSYCH: mentation appears normal, affect normal/bright    Results for orders placed or performed in visit on 10/27/22 (from the past 24 hour(s))   HCG Qual, Urine ([id number])   Result Value Ref Range    hCG Urine Qualitative Negative Negative                   Answers for HPI/ROS submitted by the patient on 10/27/2022  If you checked off any problems, how difficult have these problems made it for you to do your work, take care of things at home, or get along with other people?: Somewhat difficult  PHQ9 TOTAL SCORE: 5

## 2023-06-09 DIAGNOSIS — Z79.899 ENCOUNTER FOR LONG-TERM (CURRENT) USE OF MEDICATIONS: ICD-10-CM

## 2023-06-09 DIAGNOSIS — F33.1 MAJOR DEPRESSIVE DISORDER, RECURRENT EPISODE, MODERATE (H): Primary | ICD-10-CM

## 2023-06-09 DIAGNOSIS — Z79.01 LONG TERM (CURRENT) USE OF ANTICOAGULANTS: ICD-10-CM

## 2023-06-26 ENCOUNTER — PATIENT OUTREACH (OUTPATIENT)
Dept: CARE COORDINATION | Facility: CLINIC | Age: 28
End: 2023-06-26
Payer: COMMERCIAL

## 2023-07-10 ENCOUNTER — PATIENT OUTREACH (OUTPATIENT)
Dept: CARE COORDINATION | Facility: CLINIC | Age: 28
End: 2023-07-10
Payer: COMMERCIAL

## 2023-07-16 ENCOUNTER — E-VISIT (OUTPATIENT)
Dept: URGENT CARE | Facility: CLINIC | Age: 28
End: 2023-07-16
Payer: COMMERCIAL

## 2023-07-16 DIAGNOSIS — R09.81 NASAL CONGESTION: Primary | ICD-10-CM

## 2023-07-16 PROCEDURE — 99207 PR NON-BILLABLE SERV PER CHARTING: CPT | Performed by: FAMILY MEDICINE

## 2023-07-16 NOTE — PATIENT INSTRUCTIONS
* Sinusitis (No Antibiotics)    The sinuses are air-filled spaces within the bones of the face. They connect to the inside of the nose. Sinusitis is an inflammation of the tissue that lines the sinuses. Sinusitis can occur during a cold. It can also happen due to allergies to pollens and other particles in the air. It can cause symptoms such as sinus congestion, headache, sore throat, facial swelling, and a feeling of fullness. It may also cause a low-grade fever. Your sinusitis does not include an infection with bacteria. Because of this, antibiotics are not used to treat this problem.  Home care    Drink plenty of water, hot tea, and other liquids. This may help thin nasal mucus. It also may help your sinuses drain fluids.    Heat may help soothe painful areas of your face. Use a towel soaked in hot water. Or,  the shower and direct the warm spray onto your face. Using a vaporizer along with a menthol rub at night may also help soothe symptoms.     An expectorant with guaifenesin may help thin nasal mucus and help your sinuses drain fluids.    You can use an over-the-counter decongestant, unless a similar medicine was prescribed to you. Nasal sprays work the fastest. Use one that contains phenylephrine or oxymetazoline. First blow your nose gently. Then use the spray. Do not use these medicines more often than directed on the label. If you do, your symptoms may get worse. You may also take pills that contain pseudoephedrine. Don t use products that combine multiple medicines. This is because side effects may be increased. Read all medicine labels. You can also ask the pharmacist for help. (People with high blood pressure should not use decongestants. They can raise blood pressure.)    Over-the-counter antihistamines may help if allergies contributed to your sinusitis.      Use acetaminophen or ibuprofen to control pain, unless another pain medicine was prescribed to you. If you have chronic liver or  kidney disease or ever had a stomach ulcer, talk with your healthcare provider before using these medicines. (Aspirin should never be taken by anyone under age 18 who is ill with a fever. It may cause severe liver damage.)    Use nasal rinses or irrigation as instructed by your healthcare provider.    Don't smoke. This can make symptoms worse.  Follow-up care  Follow up with your healthcare provider or our staff if you are NOT better in 1 week.  When to seek medical advice  Call your healthcare provider if any of these occur:    Green or yellow fluid draining from your nose or into your throat    Facial pain or headache that gets worse    Stiff neck    Unusual drowsiness or confusion    Swelling of your forehead or eyelids    Vision problems, such as blurred or double vision    Fever of 100.4 F (38 C) or higher, or as directed by your healthcare provider    Seizure    Breathing problems    Symptoms that don't go away in 10 days  For informational purposes only. Not to replace the advice of your health care provider.  Copyright   2018 [facility]. All rights reserved.        Dear Jerome Avila    After reviewing your responses, I've been able to diagnose you with Nasal congestion.      Based on your responses and diagnosis, I have prescribed No orders of the defined types were placed in this encounter.   to treat your symptoms. I have sent this to your pharmacy.?     It is also important to stay well hydrated, get lots of rest and take over-the-counter decongestants,?tylenol?or ibuprofen if you?are able to?take those medications per your primary care provider to help relieve discomfort.?     It is important that you take?all of?your prescribed medication even if your symptoms are improving after a few doses.? Taking?all of?your medicine helps prevent the symptoms from returning.?     If your symptoms worsen, you develop severe headache, vomiting, high fever (>102), or are not improving in 7 days,  please contact your primary care provider for an appointment or visit any of our convenient Walk-in Care or Urgent Care Centers to be seen which can be found on our website?here.?     Thanks again for choosing?us?as your health care partner,?   ?  Hiwot Dias MD?

## 2023-09-07 ENCOUNTER — OFFICE VISIT (OUTPATIENT)
Dept: FAMILY MEDICINE | Facility: CLINIC | Age: 28
End: 2023-09-07
Payer: COMMERCIAL

## 2023-09-07 VITALS
SYSTOLIC BLOOD PRESSURE: 123 MMHG | WEIGHT: 143.7 LBS | OXYGEN SATURATION: 97 % | DIASTOLIC BLOOD PRESSURE: 81 MMHG | BODY MASS INDEX: 24.53 KG/M2 | HEART RATE: 78 BPM | TEMPERATURE: 97.7 F | HEIGHT: 64 IN | RESPIRATION RATE: 14 BRPM

## 2023-09-07 DIAGNOSIS — F41.9 ANXIETY: ICD-10-CM

## 2023-09-07 DIAGNOSIS — F11.21 OPIOID DEPENDENCE, IN REMISSION (H): ICD-10-CM

## 2023-09-07 DIAGNOSIS — J45.20 MILD INTERMITTENT ASTHMA WITHOUT COMPLICATION: ICD-10-CM

## 2023-09-07 DIAGNOSIS — Z79.899 ENCOUNTER FOR LONG-TERM (CURRENT) USE OF MEDICATIONS: ICD-10-CM

## 2023-09-07 DIAGNOSIS — F31.81 BIPOLAR 2 DISORDER, MAJOR DEPRESSIVE EPISODE (H): ICD-10-CM

## 2023-09-07 DIAGNOSIS — F33.1 MAJOR DEPRESSIVE DISORDER, RECURRENT EPISODE, MODERATE (H): ICD-10-CM

## 2023-09-07 DIAGNOSIS — F43.12 CHRONIC POST-TRAUMATIC STRESS DISORDER (PTSD): Chronic | ICD-10-CM

## 2023-09-07 DIAGNOSIS — Z00.00 ENCOUNTER FOR ROUTINE HISTORY AND PHYSICAL EXAM IN FEMALE: Primary | ICD-10-CM

## 2023-09-07 LAB
ALBUMIN SERPL BCG-MCNC: 4.6 G/DL (ref 3.5–5.2)
ALP SERPL-CCNC: 79 U/L (ref 35–104)
ALT SERPL W P-5'-P-CCNC: 11 U/L (ref 0–50)
AST SERPL W P-5'-P-CCNC: 24 U/L (ref 0–45)
BILIRUB DIRECT SERPL-MCNC: <0.2 MG/DL (ref 0–0.3)
BILIRUB SERPL-MCNC: 0.4 MG/DL
PROT SERPL-MCNC: 7.6 G/DL (ref 6.4–8.3)

## 2023-09-07 PROCEDURE — 90471 IMMUNIZATION ADMIN: CPT | Performed by: NURSE PRACTITIONER

## 2023-09-07 PROCEDURE — 36415 COLL VENOUS BLD VENIPUNCTURE: CPT | Performed by: NURSE PRACTITIONER

## 2023-09-07 PROCEDURE — 99213 OFFICE O/P EST LOW 20 MIN: CPT | Mod: 25 | Performed by: NURSE PRACTITIONER

## 2023-09-07 PROCEDURE — 90686 IIV4 VACC NO PRSV 0.5 ML IM: CPT | Performed by: NURSE PRACTITIONER

## 2023-09-07 PROCEDURE — 96127 BRIEF EMOTIONAL/BEHAV ASSMT: CPT | Performed by: NURSE PRACTITIONER

## 2023-09-07 PROCEDURE — 80076 HEPATIC FUNCTION PANEL: CPT | Performed by: NURSE PRACTITIONER

## 2023-09-07 PROCEDURE — 99395 PREV VISIT EST AGE 18-39: CPT | Mod: 25 | Performed by: NURSE PRACTITIONER

## 2023-09-07 RX ORDER — ALBUTEROL SULFATE 90 UG/1
2 AEROSOL, METERED RESPIRATORY (INHALATION) EVERY 4 HOURS PRN
Qty: 18 G | Refills: 0 | Status: SHIPPED | OUTPATIENT
Start: 2023-09-07

## 2023-09-07 ASSESSMENT — ENCOUNTER SYMPTOMS
WEAKNESS: 0
EYE PAIN: 0
ABDOMINAL PAIN: 0
NERVOUS/ANXIOUS: 0
DIZZINESS: 0
COUGH: 0
BREAST MASS: 0
CHILLS: 0
SORE THROAT: 0
HEMATURIA: 0
JOINT SWELLING: 0
FEVER: 0
PALPITATIONS: 0
PARESTHESIAS: 0
FREQUENCY: 0
HEADACHES: 0
CONSTIPATION: 0
NAUSEA: 0
DIARRHEA: 0
ARTHRALGIAS: 1
DYSURIA: 0
HEARTBURN: 0
HEMATOCHEZIA: 0
SHORTNESS OF BREATH: 0
MYALGIAS: 0

## 2023-09-07 ASSESSMENT — PATIENT HEALTH QUESTIONNAIRE - PHQ9
10. IF YOU CHECKED OFF ANY PROBLEMS, HOW DIFFICULT HAVE THESE PROBLEMS MADE IT FOR YOU TO DO YOUR WORK, TAKE CARE OF THINGS AT HOME, OR GET ALONG WITH OTHER PEOPLE: SOMEWHAT DIFFICULT
SUM OF ALL RESPONSES TO PHQ QUESTIONS 1-9: 4
SUM OF ALL RESPONSES TO PHQ QUESTIONS 1-9: 4

## 2023-09-07 ASSESSMENT — ASTHMA QUESTIONNAIRES: ACT_TOTALSCORE: 25

## 2023-09-07 ASSESSMENT — PAIN SCALES - GENERAL: PAINLEVEL: NO PAIN (0)

## 2023-09-07 NOTE — PROGRESS NOTES
Assessment and Plan:    Encounter for routine history and physical exam in female  Recommend considering a healthy diet and exercising.  She believes she has received the hepatitis a and B vaccines.  Provided influenza vaccine today.    Mild intermittent asthma without complication  This is controlled.  She continues albuterol as needed.  - albuterol (PROAIR HFA/PROVENTIL HFA/VENTOLIN HFA) 108 (90 Base) MCG/ACT inhaler  Dispense: 18 g; Refill: 0    Opioid dependence, in remission (H)  Bipolar 2 disorder, major depressive episode (H)  Major depressive disorder, recurrent episode, moderate (H)  Chronic post-traumatic stress disorder (PTSD)  Anxiety  Patient is seeing psychiatry.  She continues venlafaxine, propranolol, naltrexone, gabapentin.  I congratulated her on her sobriety.    Encounter for long-term (current) use of medications  - Hepatic function panel      Subjective:     Jerome is a 28 year old female presenting to the clinic for a female physical.       LMP: two weeks ago, has IUD  Hx of abnormal pap smear: none   Last pap smear: 7/25/22 normal   Perform self-breast exams: none  Vaginal discharge or irritation: none   Sexually active: yes, in a relationship with a male for 10 years   Contraception: IUD  Concerns for STDs: none   Previous pregnancies:one pregnancy (vaginal delivery)   Daughter is 3 1/2 years old    Patient has a history of bipolar disorder, PTSD, alcohol use and opioid dependence.  She has been seeing psychiatry.  She has been sober since Christmas Eve.  She is prescribed venlafaxine, propranolol, naltrexone, and gabapentin.  She has a history of intermittent asthma diagnosed in childhood.  She uses an albuterol inhaler prior to exercise.  Allergies including pollen exposure exacerbate her symptoms.  She uses her albuterol once every 1 to 2 months.    Review of systems:  I performed a 10 point review of systems.  All pertinent positives and negatives are noted in the HPI. All others are  negative.     No Known Allergies    Current Outpatient Medications   Medication    albuterol (PROAIR HFA;PROVENTIL HFA;VENTOLIN HFA) 90 mcg/actuation inhaler    gabapentin (NEURONTIN) 300 MG capsule    naltrexone (DEPADE/REVIA) 50 MG tablet    propranolol (INDERAL) 10 MG tablet    venlafaxine (EFFEXOR XR) 150 MG 24 hr capsule    venlafaxine (EFFEXOR XR) 75 MG 24 hr capsule     No current facility-administered medications for this visit.       Social History     Socioeconomic History    Marital status: Single     Spouse name: Not on file    Number of children: Not on file    Years of education: Not on file    Highest education level: Not on file   Occupational History    Not on file   Tobacco Use    Smoking status: Every Day     Packs/day: 0.50     Types: Cigarettes     Last attempt to quit: 2014     Years since quittin.6     Passive exposure: Current    Smokeless tobacco: Never   Vaping Use    Vaping Use: Every day   Substance and Sexual Activity    Alcohol use: Not Currently     Comment: sober for 2 months    Drug use: No     Comment: Drug use: Hasn't used illicit drugs in 3 years    Sexual activity: Yes     Partners: Male     Comment: engaged   Other Topics Concern    Not on file   Social History Narrative    Not on file     Social Determinants of Health     Financial Resource Strain: Not on file   Food Insecurity: Not on file   Transportation Needs: Not on file   Physical Activity: Not on file   Stress: Not on file   Social Connections: Not on file   Intimate Partner Violence: Not on file   Housing Stability: Not on file       Past Medical History:   Diagnosis Date    Alcohol abuse     Bipolar 2 disorder (H)     Deliberate self-cutting     Depression     Drug abuse, opioid type (H)     PTSD (post-traumatic stress disorder)     Suicidal ideation        Family History   Problem Relation Age of Onset    Bipolar Disorder Mother     Suicidality Father     Depression Sister     Anxiety Disorder Sister      "Chronic Obstructive Pulmonary Disease Maternal Grandmother     Coronary Artery Disease Maternal Grandfather     No Known Problems Paternal Grandmother     No Known Problems Paternal Grandfather     Depression Sister     Anxiety Disorder Sister     Cystic Fibrosis Cousin     Cystic Fibrosis Cousin        Past Surgical History:   Procedure Laterality Date    LAPAROSCOPY DIAGNOSTIC (GENERAL)      pelvic, looking for etiology of pain       Objective:     /81   Pulse 78   Temp 97.7  F (36.5  C)   Resp 14   Ht 1.619 m (5' 3.75\")   Wt 65.2 kg (143 lb 11.2 oz)   LMP 08/24/2023 (Approximate)   SpO2 97%   BMI 24.86 kg/m      Patient is alert, no obvious distress.   Skin: Warm, dry.  No rashes or lesions. Skin turgor rapid return.   HEENT:  Eyes normal.  Ears normal.  Nose patent, mucosa pink.  Oropharynx mucosa pink, no lesions or tonsil enlargement.   Neck:  Supple, without lymphadenopathy, bruits, JVD. Thyroid normal texture and size.    Lungs:  Clear to auscultation.  No wheezing, rales noted.  Respirations even and unlabored.   Heart:  Regular rate and rhythm.  No murmurs.   Breasts:  Normal.  No surrounding adenopathy.   Abdomen: Soft, nontender.  No organomegaly.  Bowel sounds normoactive.  No guarding or masses noted.   :  deferred  Musculoskeletal:  Full ROM of extremities.  Muscle strength equal +5/5.   Neurological:  Cranial nerves 2-12 intact.        Answers submitted by the patient for this visit:  Patient Health Questionnaire (Submitted on 9/7/2023)  If you checked off any problems, how difficult have these problems made it for you to do your work, take care of things at home, or get along with other people?: Somewhat difficult  PHQ9 TOTAL SCORE: 4  Annual Preventive Visit (Submitted on 9/7/2023)  Chief Complaint: Annual Exam:  Frequency of exercise:: 1 day/week  Getting at least 3 servings of Calcium per day:: Yes  Diet:: Regular (no restrictions)  Taking medications regularly:: Yes  Medication " side effects:: None  Bi-annual eye exam:: Yes  Dental care twice a year:: NO  Sleep apnea or symptoms of sleep apnea:: None  abdominal pain: No  Blood in stool: No  Blood in urine: No  chest pain: No  chills: No  congestion: No  constipation: No  cough: No  diarrhea: No  dizziness: No  ear pain: No  eye pain: No  nervous/anxious: No  fever: No  frequency: No  genital sores: No  headaches: No  hearing loss: No  heartburn: No  arthralgias: Yes  joint swelling: No  peripheral edema: No  mood changes: No  myalgias: No  nausea: No  dysuria: No  palpitations: No  Skin sensation changes: No  sore throat: No  urgency: No  rash: No  shortness of breath: No  visual disturbance: No  weakness: No  pelvic pain: No  vaginal bleeding: No  vaginal discharge: No  tenderness: No  breast mass: No  breast discharge: No  Additional concerns today:: No  Exercise outside of work (Submitted on 9/7/2023)  Chief Complaint: Annual Exam:  Duration of exercise:: 15-30 minutes

## 2024-03-14 ENCOUNTER — VIRTUAL VISIT (OUTPATIENT)
Dept: URGENT CARE | Facility: CLINIC | Age: 29
End: 2024-03-14
Payer: COMMERCIAL

## 2024-03-14 DIAGNOSIS — R51.9 SINUS HEADACHE: Primary | ICD-10-CM

## 2024-03-14 PROCEDURE — 99441 PR PHYSICIAN TELEPHONE EVALUATION 5-10 MIN: CPT | Mod: 93

## 2024-03-14 NOTE — PROGRESS NOTES
Jerome is a 28 year old who is being evaluated via a billable video visit.          Assessment & Plan     Sinus headache  Resolved. Needs a not for work missed yesterday    Nicotine/Tobacco Cessation  She reports that she has been smoking cigarettes. She has been smoking an average of 0.5 packs per day. She has been exposed to tobacco smoke. She has never used smokeless tobacco.  Nicotine/Tobacco Cessation Plan        Return in about 1 month (around 4/14/2024), or if symptoms worsen or fail to improve.    Subjective   Jerome is a 28 year old, presenting for the following health issues:  No chief complaint on file.      HPI   Presents to virtual urgent care after having to leave work yesterday due to a sinus headache.  Needs a note saying she can return to work.  Has had sinus issues before, does not think she has a sinus infection.  States her pain goes away when she takes ibuprofen and feels much better now.          Review of Systems  Constitutional, HEENT, cardiovascular, pulmonary, gi and gu systems are negative, except as otherwise noted.      Objective           Vitals:  No vitals were obtained today due to virtual visit.    Physical Exam   GENERAL: alert and no distress  RESP: No audible wheeze, cough.            Telephone visit lasted 5 minutes    Signed Electronically by: Virtual Urgent Care

## 2024-03-14 NOTE — LETTER
March 14, 2024      Jerome Avila  [address]  [place] [address]        To Whom It May Concern:    Jerome Avila  was seen on 3/14/24.  Please excuse her from work missed on 3/13/24 due to illness.        Sincerely,      Pamela Petty [facility].

## 2024-07-01 ENCOUNTER — NURSE TRIAGE (OUTPATIENT)
Dept: NURSING | Facility: CLINIC | Age: 29
End: 2024-07-01
Payer: COMMERCIAL

## 2024-09-28 NOTE — PROGRESS NOTES
Clinic Note - Initial OB Visit    Jerome Avila is a 23 y.o.  female who presents to clinic for a new OB visit.  Her last menstrual period was unsure but middle 2019.    Estimated Date of Delivery: unknown at this time - will check dating US    She has not had bleeding. She has had on day of cramping for 1 day but resolved since her LMP. She has had mild nausea but tolerable. Weigh loss has not occurred. This was not a planned pregnancy but welcomed.     OTHER CONCERNS:   -hx depression, bipolar, PTSD: depression has gotten a little worse, on lexapro 20mg daily overall working well she feels like, wants to continue this medication  -hx opioid dependence: hx pills for a short time, but didn't do any treatment  -hx alcohol use: no alcohol since finding out was pregnant  -Patient does use e-cigarettes daily    Pre Term Labor Risk Assessment  Is the patient's age <18 or >40? no  Patint's BMI is Body mass index is 26.76 kg/m .. Does patient have a BMI < 18.5? no  If previous pregnancy, was delivery within previous 6 months? no  Have you ever delivered a baby prior to 37 weeks gestation? no  Did conception for this pregnancy occur via In Vitro Fertilization? no  Summary: Patient is not at increased risk for  labor.    Patient Active Problem List   Diagnosis     Bipolar 2 disorder, major depressive episode (H)     Chronic post-traumatic stress disorder (PTSD)     Severe alcohol use disorder (H)     Severe opioid dependence in sustained remission (H)       OB History    Para Term  AB Living   1             SAB TAB Ectopic Multiple Live Births                  # Outcome Date GA Lbr Tyron/2nd Weight Sex Delivery Anes PTL Lv   1 Current                Past Medical History:   Diagnosis Date     Alcohol abuse      Bipolar 2 disorder (H)      Deliberate self-cutting      Depression      Drug abuse, opioid type (H)      PTSD (post-traumatic stress disorder)      Suicidal ideation        Past Surgical  History:   Procedure Laterality Date     DIAGNOSTIC LAPAROSCOPY      pelvic, looking for etiology of pain       Current Outpatient Medications   Medication Sig Dispense Refill     escitalopram oxalate (LEXAPRO) 20 MG tablet Take 20 mg by mouth daily.       melatonin 1 mg Tab tablet Take 3 mg by mouth bedtime as needed.       No current facility-administered medications for this visit.        Do you have a history of any of the following medical conditions?  Condition Yes/No   Hypertension No   Heart disease, mitral valve prolapse, rheumatic fever No   Asthma or another chronic lung disease No   An autoimmune disorder No   Kidney disease No   Frequent urinary tract infections No   Migraine headaches No   Stroke, loss of sensation/function, seizures, or other neuro problem No   Diabetes No   Thyroid problems or have you taken thyroid medication No   Hepatitis, liver disease, jaundice No   Blood clots, phlebitis, pulmonary embolism or varicose veins No   Excessive bleeding after surgery or dental work No   Heavy menstrual periods requiring treatment No   Anemia No   Blood transfusions No        Would you refuse a blood transfusion? No   Breast problems No   Abnormalities of the uterus No   Abnormal pap smear No   Have you been treated for an emotional disturbance?   Yes as above   Have you been physically, sexually, or emotionally hurt by someone? No   Have you been in a major accident or suffered serious trauma? No   Have you had surgical procedures?   Yes, pelvic laparoscopy        Have you ever had any complications from anesthesia? No   Have you ever been hospitalized for a nonsurgical reason? No     Prenatal Genetic Screening  Do you have a history of any of the following Yes/No        A metabolic disorder (e.g. Insulin-dependent DM, PKU) No        Recurrent pregnancy loss No     Do you, the baby's father, or anyone in your families have Yes/No        Thalassemia AND MCV <80 No        Hemophilia No        Neural  tube defect No        Congenital heart defect No        Sickle cell disease or trait No        Muscular dystrophy No        Cystic fibrosis YES - 2 cousins        Mental retardation or autism No        Down's syndrome No        Feliciano-Sach's disease No        P[place]'s chorea No        Any other inherited genetic or chromosomal disorder No        A child with birth defects not listed above No   None in FOB's family    Infection History  At high risk for coming in contact with HIV No   Ever treated for tuberculosis No   Ever received the BCG vaccine for tuberculosis No   Ever had genital herpes (or has your partner) No   Had a rash or viral illness since LMP No   Ever had a sexually transmitted infection No   Ever had chicken pox or the vaccine Yes   Ever had any other serious infectious disease No   Up to date with immunizations Yes   STI History No      PERSONAL/SOCIAL HISTORY  Social History     Socioeconomic History     Marital status: Single     Spouse name: None     Number of children: None     Years of education: None     Highest education level: None   Occupational History     None   Social Needs     Financial resource strain: None     Food insecurity:     Worry: None     Inability: None     Transportation needs:     Medical: None     Non-medical: None   Tobacco Use     Smoking status: Current Some Day Smoker     Last attempt to quit: 2014     Years since quittin.2     Smokeless tobacco: Current User     Tobacco comment: e-cig smoker   Substance and Sexual Activity     Alcohol use: Yes     Alcohol/week: 1.2 oz     Types: 2 Cans of beer per week     Comment: She reports binge drinking on days off (two days out of the week) and drinks beers occasionally before work     Drug use: No     Comment: Hasn't used illicit drugs in 3 years     Sexual activity: Yes     Partners: Male     Comment: engaged   Lifestyle     Physical activity:     Days per week: None     Minutes per session: None     Stress: None    Relationships     Social connections:     Talks on phone: None     Gets together: None     Attends Worship service: None     Active member of club or organization: None     Attends meetings of clubs or organizations: None     Relationship status: None     Intimate partner violence:     Fear of current or ex partner: None     Emotionally abused: None     Physically abused: None     Forced sexual activity: None   Other Topics Concern     None   Social History Narrative     None     Have you used any tobacco products, alcohol or any other drugs during this pregnancy before or after your knew you were pregnant? E cigarettes but nothing else    Hx pap smear 8 yrs ago, normal result per report    REVIEW OF SYSTEMS  C: NEGATIVE for fever, chills, change in weight  E: NEGATIVE for vision changes or irritation  ENT: NEGATIVE for ear, mouth and throat problems  R: NEGATIVE for significant cough or SOB  B: NEGATIVE for masses, tenderness or discharge  CV: NEGATIVE for chest pain, palpitations or peripheral edema  GI: NEGATIVE for abdominal pain, heartburn, or change in bowel habits  : NEGATIVE for unusual urinary or vaginal symptoms.   M: NEGATIVE for significant arthralgias or myalgia  N: NEGATIVE for weakness, dizziness or paresthesias  P: NEGATIVE for changes in mood or affect    PHYSICAL EXAM:  /60   Pulse 75   Wt 151 lb 1 oz (68.5 kg)   LMP 02/10/2019 (Approximate)   BMI 26.76 kg/m     GENERAL:  Pleasant pregnant female, alert, well groomed.  Accompanied by FOB.  SKIN:  Warm and dry, without lesions or rashes  EYES:  PERRLA, EOM intact  MOUTH:  Buccal mucosa pink, moist without lesions.    NECK:  Thyroid without enlargement and nodules.  No cervical lymphadenopathy.  LUNGS:  Clear to auscultation.  No wheezes or crackles.  BREAST: Deferred per patient's request.  HEART:  RRR without murmur.  ABDOMEN: Soft without masses , tenderness or organomegaly.  Fundus palpable at symphysis pubis.  Unable to  auscultate fetal heart tones with Doptone today, suspect gestational age less than 10 weeks.  MUSCULOSKELETAL:  Full range of motion.  EXTREMITIES:  No edema. No significant varicosities.   GENITALIA:  Normal appearing anatomy, no lesions noted.  VAGINA:  Pink, normal rugae, mild discharge noted  CERVIX:  Smooth, normal appearing    ASSESSMENT/PLAN  1. Encounter for supervision of normal first pregnancy in first trimester  G1 at unknown gestational age but suspect between 6-9 weeks.  Dating ultrasound ordered today.  Labs as below.  Risks for this pregnancy include: Mental health issues on SSRI therapy, history opioid abuse now in remission, history of alcohol abuse now in remission, current E cigarette use, family history cystic fibrosis and 2 cousins.  - ABO/RH Typing (OP order)  - Hepatitis B Surface antigen (HBsAG)  - HIV Antigen/Antibody Screening Cascade  - HML Antibody Screen  - RPR  - Rubella Immune Status (IgG)  - Urinalysis Macroscopic  - Culture, Urine  - Chlamydia/gonorrhoeae CERVIX  - US OB < 14 Weeks; Future  - HM2(CBC w/o Differential)  - Wet Prep, Vaginal    2. Chronic post-traumatic stress disorder (PTSD)  3. Bipolar 2 disorder, major depressive episode (H)  4. Moderate episode of recurrent major depressive disorder (H)  Patient with a history of the above mental health disorders.  She is currently taking Lexapro 20 mg daily and overall feels that this is going well for her.  PHQ 9 score 11 today, no thoughts of self-harm.  Nate 7 score 6 today.  Discussed that it is reasonably safe to continue Lexapro during her pregnancy, discussed that Zoloft and Prozac have been more deeply study during pregnancy but that evidence shows that Lexapro is also reasonably safe.    5. Severe opioid dependence in sustained remission (H)  Patient shares having issues with opioid pills for a short time many years ago, did not need to go to treatment or use other medications to help with this, denies further use.  - Drug  Abuse 1+, Urine    6. Cervical cancer screening  Due for cervical cancer screening today, reports history normal Pap smear around 8 years ago.  If Pap smear normal today would recheck in 3 years.  - Gynecologic Cytology (PAP Smear)      - Routine prenatal labs today. CBC, type and screen, rubella, HIV, gonorrhea/chlamydia, RPR, hepatitis B, urinalysis with culture.   - Pap smear done today   - Early ultrasound for dating ordered today.     Referral(s): none    Discussed:  -Recommended weight gain of 25-35 for BMI of Body mass index is 26.76 kg/m ..  -Briefly discussed genetic testing given patient's family history of cystic fibrosis, unable to do at this time given on clear gestational age, will discuss at next visit  -Safe medications during pregnancy. Patient is taking prenatal vitamin daily.   -Healthy habits including not using tobacco or alcohol, exercising regularly and maintaining healthy diet  -Information given on tips for dealing with nausea, healthy habits, exposures, safety, prenatal appointment schedule, and when to call the doctor.  -Recommendations for breastfeeding.    Will follow up in 4 weeks for routine pre-chano care.    Yoon Villafuerte MD   No

## 2024-10-24 ENCOUNTER — OFFICE VISIT (OUTPATIENT)
Dept: FAMILY MEDICINE | Facility: CLINIC | Age: 29
End: 2024-10-24
Attending: NURSE PRACTITIONER
Payer: COMMERCIAL

## 2024-10-24 VITALS
OXYGEN SATURATION: 98 % | TEMPERATURE: 99.3 F | SYSTOLIC BLOOD PRESSURE: 114 MMHG | BODY MASS INDEX: 24.41 KG/M2 | HEART RATE: 86 BPM | HEIGHT: 64 IN | WEIGHT: 143 LBS | RESPIRATION RATE: 16 BRPM | DIASTOLIC BLOOD PRESSURE: 76 MMHG

## 2024-10-24 DIAGNOSIS — Z79.899 MEDICATION MANAGEMENT: ICD-10-CM

## 2024-10-24 DIAGNOSIS — F31.81 BIPOLAR 2 DISORDER, MAJOR DEPRESSIVE EPISODE (H): Chronic | ICD-10-CM

## 2024-10-24 DIAGNOSIS — Z00.00 ENCOUNTER FOR ROUTINE HISTORY AND PHYSICAL EXAM IN FEMALE: Primary | ICD-10-CM

## 2024-10-24 DIAGNOSIS — F11.21 SEVERE OPIOID DEPENDENCE IN SUSTAINED REMISSION (H): Chronic | ICD-10-CM

## 2024-10-24 DIAGNOSIS — Z87.898 HISTORY OF ALCOHOL USE DISORDER: ICD-10-CM

## 2024-10-24 DIAGNOSIS — Z13.220 LIPID SCREENING: ICD-10-CM

## 2024-10-24 DIAGNOSIS — J45.20 MILD INTERMITTENT ASTHMA WITHOUT COMPLICATION: ICD-10-CM

## 2024-10-24 DIAGNOSIS — F43.12 CHRONIC POST-TRAUMATIC STRESS DISORDER (PTSD): Chronic | ICD-10-CM

## 2024-10-24 DIAGNOSIS — F41.9 ANXIETY: ICD-10-CM

## 2024-10-24 LAB
ALBUMIN SERPL BCG-MCNC: 4.4 G/DL (ref 3.5–5.2)
ALP SERPL-CCNC: 78 U/L (ref 40–150)
ALT SERPL W P-5'-P-CCNC: 12 U/L (ref 0–50)
ANION GAP SERPL CALCULATED.3IONS-SCNC: 11 MMOL/L (ref 7–15)
AST SERPL W P-5'-P-CCNC: 20 U/L (ref 0–45)
BILIRUB SERPL-MCNC: <0.2 MG/DL
BUN SERPL-MCNC: 14.8 MG/DL (ref 6–20)
CALCIUM SERPL-MCNC: 9.2 MG/DL (ref 8.8–10.4)
CHLORIDE SERPL-SCNC: 104 MMOL/L (ref 98–107)
CHOLEST SERPL-MCNC: 176 MG/DL
CREAT SERPL-MCNC: 0.84 MG/DL (ref 0.51–0.95)
EGFRCR SERPLBLD CKD-EPI 2021: >90 ML/MIN/1.73M2
FASTING STATUS PATIENT QL REPORTED: NO
FASTING STATUS PATIENT QL REPORTED: NO
GLUCOSE SERPL-MCNC: 87 MG/DL (ref 70–99)
HCO3 SERPL-SCNC: 25 MMOL/L (ref 22–29)
HDLC SERPL-MCNC: 64 MG/DL
HGB BLD-MCNC: 13.5 G/DL (ref 11.7–15.7)
LDLC SERPL CALC-MCNC: 96 MG/DL
NONHDLC SERPL-MCNC: 112 MG/DL
POTASSIUM SERPL-SCNC: 4.4 MMOL/L (ref 3.4–5.3)
PROT SERPL-MCNC: 7.3 G/DL (ref 6.4–8.3)
SODIUM SERPL-SCNC: 140 MMOL/L (ref 135–145)
TRIGL SERPL-MCNC: 79 MG/DL

## 2024-10-24 PROCEDURE — 99395 PREV VISIT EST AGE 18-39: CPT | Mod: 25 | Performed by: NURSE PRACTITIONER

## 2024-10-24 PROCEDURE — 90480 ADMN SARSCOV2 VAC 1/ONLY CMP: CPT | Performed by: NURSE PRACTITIONER

## 2024-10-24 PROCEDURE — 80061 LIPID PANEL: CPT | Performed by: NURSE PRACTITIONER

## 2024-10-24 PROCEDURE — 99213 OFFICE O/P EST LOW 20 MIN: CPT | Mod: 25 | Performed by: NURSE PRACTITIONER

## 2024-10-24 PROCEDURE — 85018 HEMOGLOBIN: CPT | Performed by: NURSE PRACTITIONER

## 2024-10-24 PROCEDURE — 90471 IMMUNIZATION ADMIN: CPT | Performed by: NURSE PRACTITIONER

## 2024-10-24 PROCEDURE — 80053 COMPREHEN METABOLIC PANEL: CPT | Performed by: NURSE PRACTITIONER

## 2024-10-24 PROCEDURE — 91320 SARSCV2 VAC 30MCG TRS-SUC IM: CPT | Performed by: NURSE PRACTITIONER

## 2024-10-24 PROCEDURE — 36415 COLL VENOUS BLD VENIPUNCTURE: CPT | Performed by: NURSE PRACTITIONER

## 2024-10-24 PROCEDURE — 90656 IIV3 VACC NO PRSV 0.5 ML IM: CPT | Performed by: NURSE PRACTITIONER

## 2024-10-24 RX ORDER — ALBUTEROL SULFATE 90 UG/1
2 INHALANT RESPIRATORY (INHALATION) EVERY 4 HOURS PRN
Qty: 18 G | Refills: 0 | Status: SHIPPED | OUTPATIENT
Start: 2024-10-24

## 2024-10-24 SDOH — HEALTH STABILITY: PHYSICAL HEALTH: ON AVERAGE, HOW MANY DAYS PER WEEK DO YOU ENGAGE IN MODERATE TO STRENUOUS EXERCISE (LIKE A BRISK WALK)?: 5 DAYS

## 2024-10-24 ASSESSMENT — PATIENT HEALTH QUESTIONNAIRE - PHQ9
SUM OF ALL RESPONSES TO PHQ QUESTIONS 1-9: 2
SUM OF ALL RESPONSES TO PHQ QUESTIONS 1-9: 2
10. IF YOU CHECKED OFF ANY PROBLEMS, HOW DIFFICULT HAVE THESE PROBLEMS MADE IT FOR YOU TO DO YOUR WORK, TAKE CARE OF THINGS AT HOME, OR GET ALONG WITH OTHER PEOPLE: SOMEWHAT DIFFICULT

## 2024-10-24 ASSESSMENT — ASTHMA QUESTIONNAIRES
QUESTION_5 LAST FOUR WEEKS HOW WOULD YOU RATE YOUR ASTHMA CONTROL: COMPLETELY CONTROLLED
ACT_TOTALSCORE: 24
QUESTION_4 LAST FOUR WEEKS HOW OFTEN HAVE YOU USED YOUR RESCUE INHALER OR NEBULIZER MEDICATION (SUCH AS ALBUTEROL): NOT AT ALL
QUESTION_3 LAST FOUR WEEKS HOW OFTEN DID YOUR ASTHMA SYMPTOMS (WHEEZING, COUGHING, SHORTNESS OF BREATH, CHEST TIGHTNESS OR PAIN) WAKE YOU UP AT NIGHT OR EARLIER THAN USUAL IN THE MORNING: NOT AT ALL
QUESTION_2 LAST FOUR WEEKS HOW OFTEN HAVE YOU HAD SHORTNESS OF BREATH: ONCE OR TWICE A WEEK
ACT_TOTALSCORE: 24
QUESTION_1 LAST FOUR WEEKS HOW MUCH OF THE TIME DID YOUR ASTHMA KEEP YOU FROM GETTING AS MUCH DONE AT WORK, SCHOOL OR AT HOME: NONE OF THE TIME

## 2024-10-24 ASSESSMENT — SOCIAL DETERMINANTS OF HEALTH (SDOH): HOW OFTEN DO YOU GET TOGETHER WITH FRIENDS OR RELATIVES?: ONCE A WEEK

## 2024-10-24 ASSESSMENT — PAIN SCALES - GENERAL: PAINLEVEL_OUTOF10: NO PAIN (0)

## 2024-10-24 NOTE — PROGRESS NOTES
Assessment and Plan:    Encounter for routine history and physical exam in female  Recommend consuming a healthy diet and exercising.  Provided influenza and COVID vaccines.  Patient declines hepatitis A and B vaccines.  She believes she received these in the past.  - Hemoglobin    Lipid screening  - Lipid panel reflex to direct LDL Non-fasting    Mild intermittent asthma without complication  This is controlled.  She continues albuterol as needed.  - albuterol (PROAIR HFA/PROVENTIL HFA/VENTOLIN HFA) 108 (90 Base) MCG/ACT inhaler  Dispense: 18 g; Refill: 0    Bipolar 2 disorder, major depressive episode (H)  Chronic post-traumatic stress disorder (PTSD)  Severe opioid dependence in sustained remission (H)  History of alcohol use disorder  Anxiety  Patient states this is controlled.  She is followed by psychiatry at [facility].  She is taking gabapentin, propranolol, venlafaxine.    Medication management  - Comprehensive metabolic panel (BMP + Alb, Alk Phos, ALT, AST, Total. Bili, TP)      Subjective:     Jerome is a 29 year old female presenting to the clinic for a female physical.     LMP: Monday, has IUD  Hx of abnormal pap smear: none   Last pap smear: 7/25/22 normal   Perform self-breast exams: none  Vaginal discharge or irritation: none   Sexually active: yes, in a relationship with a male for 11 years   Contraception: IUD  Concerns for STDs: none   Previous pregnancies:one pregnancy (vaginal delivery)   Daughter is 4 1/2 years old     Patient has a history of bipolar disorder, PTSD, history of alcohol use and opioid dependence.  She sees psychiatry at [facility].  She has not consumed alcohol since 2022.   She does occasionally smoke marijuana. prescribed venlafaxine, propranolol, and gabapentin.  She denies thoughts of suicide.  She feels well supported.  She has a history of intermittent asthma diagnosed in childhood.  She uses an albuterol inhaler prior to exercise and with allergy symptoms.   Allergies including pollen exposure exacerbate her symptoms.  She uses her albuterol inhaler 5-6 times/year.     Review of systems:  I performed a 10 point review of systems.  All pertinent positives and negatives are noted in the HPI. All others are negative.     No Known Allergies    Current Outpatient Medications   Medication Sig Dispense Refill    albuterol (PROAIR HFA/PROVENTIL HFA/VENTOLIN HFA) 108 (90 Base) MCG/ACT inhaler Inhale 2 puffs into the lungs every 4 hours as needed for shortness of breath, wheezing or cough 18 g 0    gabapentin (NEURONTIN) 300 MG capsule Take 1 capsule (300 mg) by mouth 3 times daily 90 capsule 0    propranolol (INDERAL) 10 MG tablet Take 1 tablet by mouth 2 times daily as needed      venlafaxine (EFFEXOR XR) 150 MG 24 hr capsule Take 150 mg by mouth daily 300 mg daily take 2 daily       No current facility-administered medications for this visit.       Social History     Socioeconomic History    Marital status: Single     Spouse name: Not on file    Number of children: Not on file    Years of education: Not on file    Highest education level: Not on file   Occupational History    Not on file   Tobacco Use    Smoking status: Former     Current packs/day: 0.00     Types: Cigarettes     Quit date: 1/1/2014     Years since quitting: 10.8     Passive exposure: Past    Smokeless tobacco: Never   Vaping Use    Vaping status: Former   Substance and Sexual Activity    Alcohol use: Not Currently     Comment: sober for 2 months    Drug use: No     Comment: Drug use: Hasn't used illicit drugs in 3 years    Sexual activity: Yes     Partners: Male     Comment: engaged   Other Topics Concern    Not on file   Social History Narrative    Not on file     Social Drivers of Health     Financial Resource Strain: Low Risk  (10/24/2024)    Financial Resource Strain     Within the past 12 months, have you or your family members you live with been unable to get utilities (heat, electricity) when it was  really needed?: No   Food Insecurity: Low Risk  (10/24/2024)    Food Insecurity     Within the past 12 months, did you worry that your food would run out before you got money to buy more?: No     Within the past 12 months, did the food you bought just not last and you didn t have money to get more?: No   Transportation Needs: Low Risk  (10/24/2024)    Transportation Needs     Within the past 12 months, has lack of transportation kept you from medical appointments, getting your medicines, non-medical meetings or appointments, work, or from getting things that you need?: No   Physical Activity: Unknown (10/24/2024)    Exercise Vital Sign     Days of Exercise per Week: 5 days     Minutes of Exercise per Session: Not on file   Stress: No Stress Concern Present (10/24/2024)    [employer] - Occupational Stress Questionnaire     Feeling of Stress : Only a little   Social Connections: Unknown (10/24/2024)    Social Connection and Isolation Panel [NHANES]     Frequency of Communication with Friends and Family: Not on file     Frequency of Social Gatherings with Friends and Family: Once a week     Attends Spiritism Services: Not on file     Active Member of Clubs or Organizations: Not on file     Attends Club or Organization Meetings: Not on file     Marital Status: Not on file   Interpersonal Safety: Low Risk  (10/24/2024)    Interpersonal Safety     Do you feel physically and emotionally safe where you currently live?: Yes     Within the past 12 months, have you been hit, slapped, kicked or otherwise physically hurt by someone?: No     Within the past 12 months, have you been humiliated or emotionally abused in other ways by your partner or ex-partner?: No   Housing Stability: Low Risk  (10/24/2024)    Housing Stability     Do you have housing? : Yes     Are you worried about losing your housing?: No       Past Medical History:   Diagnosis Date    Alcohol abuse     Bipolar 2 disorder (H)      "Deliberate self-cutting     Depression     Drug abuse, opioid type (H)     PTSD (post-traumatic stress disorder)     Suicidal ideation        Family History   Problem Relation Age of Onset    Bipolar Disorder Mother     Suicidality Father     Depression Sister     Anxiety Disorder Sister     Chronic Obstructive Pulmonary Disease Maternal Grandmother     Coronary Artery Disease Maternal Grandfather     No Known Problems Paternal Grandmother     No Known Problems Paternal Grandfather     Depression Sister     Anxiety Disorder Sister     Cystic Fibrosis Cousin     Cystic Fibrosis Cousin        Past Surgical History:   Procedure Laterality Date    LAPAROSCOPY DIAGNOSTIC (GENERAL)      pelvic, looking for etiology of pain       Objective:     /76   Pulse 86   Temp 99.3  F (37.4  C)   Resp 16   Ht 1.626 m (5' 4\")   Wt 64.9 kg (143 lb)   LMP 10/21/2024   SpO2 98%   Breastfeeding No   BMI 24.55 kg/m      Patient is alert, no obvious distress.   Skin: Warm, dry.  No rashes or lesions. Skin turgor rapid return.   HEENT:  Eyes normal.  Ears normal.  Nose patent, mucosa pink.  Oropharynx mucosa pink, no lesions or tonsil enlargement.   Neck:  Supple, without lymphadenopathy, bruits, JVD. Thyroid normal texture and size.    Lungs:  Clear to auscultation.  No wheezing, rales noted.  Respirations even and unlabored.   Heart:  Regular rate and rhythm.  No murmurs.   Breasts:  Normal.  No surrounding adenopathy.   Abdomen: Soft, nontender.  No organomegaly.  Bowel sounds normoactive.  No guarding or masses noted.   :  deferred  Musculoskeletal:  Full ROM of extremities.  Muscle strength equal +5/5.   Neurological:  Cranial nerves 2-12 intact.              Answers submitted by the patient for this visit:  Patient Health Questionnaire (Submitted on 10/24/2024)  If you checked off any problems, how difficult have these problems made it for you to do your work, take care of things at home, or get along with other " people?: Somewhat difficult  PHQ9 TOTAL SCORE: 2